# Patient Record
Sex: FEMALE | Race: WHITE | NOT HISPANIC OR LATINO | URBAN - METROPOLITAN AREA
[De-identification: names, ages, dates, MRNs, and addresses within clinical notes are randomized per-mention and may not be internally consistent; named-entity substitution may affect disease eponyms.]

---

## 2017-04-05 ENCOUNTER — INPATIENT (INPATIENT)
Facility: HOSPITAL | Age: 82
LOS: 4 days | Discharge: NURSING, REHAB OR LONG-TERM CARE | End: 2017-04-10
Attending: SURGERY

## 2017-04-11 PROBLEM — Z00.00 ENCOUNTER FOR PREVENTIVE HEALTH EXAMINATION: Status: ACTIVE | Noted: 2017-04-11

## 2017-05-09 ENCOUNTER — OUTPATIENT (OUTPATIENT)
Dept: OUTPATIENT SERVICES | Facility: HOSPITAL | Age: 82
LOS: 1 days | Discharge: HOME | End: 2017-05-09

## 2017-06-28 DIAGNOSIS — R79.89 OTHER SPECIFIED ABNORMAL FINDINGS OF BLOOD CHEMISTRY: ICD-10-CM

## 2017-06-28 DIAGNOSIS — E55.9 VITAMIN D DEFICIENCY, UNSPECIFIED: ICD-10-CM

## 2017-06-28 DIAGNOSIS — D50.9 IRON DEFICIENCY ANEMIA, UNSPECIFIED: ICD-10-CM

## 2017-06-28 DIAGNOSIS — D52.9 FOLATE DEFICIENCY ANEMIA, UNSPECIFIED: ICD-10-CM

## 2017-06-28 DIAGNOSIS — E11.9 TYPE 2 DIABETES MELLITUS WITHOUT COMPLICATIONS: ICD-10-CM

## 2017-06-28 DIAGNOSIS — R82.90 UNSPECIFIED ABNORMAL FINDINGS IN URINE: ICD-10-CM

## 2017-06-28 DIAGNOSIS — E53.8 DEFICIENCY OF OTHER SPECIFIED B GROUP VITAMINS: ICD-10-CM

## 2017-06-28 DIAGNOSIS — E03.9 HYPOTHYROIDISM, UNSPECIFIED: ICD-10-CM

## 2017-09-07 ENCOUNTER — OUTPATIENT (OUTPATIENT)
Dept: OUTPATIENT SERVICES | Facility: HOSPITAL | Age: 82
LOS: 1 days | Discharge: HOME | End: 2017-09-07
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
## 2017-09-07 DIAGNOSIS — E11.9 TYPE 2 DIABETES MELLITUS WITHOUT COMPLICATIONS: ICD-10-CM

## 2017-09-07 DIAGNOSIS — E53.8 DEFICIENCY OF OTHER SPECIFIED B GROUP VITAMINS: ICD-10-CM

## 2017-09-07 DIAGNOSIS — E03.9 HYPOTHYROIDISM, UNSPECIFIED: ICD-10-CM

## 2017-09-07 DIAGNOSIS — D50.9 IRON DEFICIENCY ANEMIA, UNSPECIFIED: ICD-10-CM

## 2017-09-07 DIAGNOSIS — E55.9 VITAMIN D DEFICIENCY, UNSPECIFIED: ICD-10-CM

## 2018-02-28 DIAGNOSIS — S32.9XXA FRACTURE OF UNSPECIFIED PARTS OF LUMBOSACRAL SPINE AND PELVIS, INITIAL ENCOUNTER FOR CLOSED FRACTURE: ICD-10-CM

## 2018-02-28 DIAGNOSIS — M10.9 GOUT, UNSPECIFIED: ICD-10-CM
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
## 2018-02-28 DIAGNOSIS — I10 ESSENTIAL (PRIMARY) HYPERTENSION: ICD-10-CM
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
## 2018-02-28 DIAGNOSIS — S32.411A DISPLACED FRACTURE OF ANTERIOR WALL OF RIGHT ACETABULUM, INITIAL ENCOUNTER FOR CLOSED FRACTURE: ICD-10-CM
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
## 2018-02-28 DIAGNOSIS — Z90.49 ACQUIRED ABSENCE OF OTHER SPECIFIED PARTS OF DIGESTIVE TRACT: ICD-10-CM

## 2018-02-28 DIAGNOSIS — S32.591A OTHER SPECIFIED FRACTURE OF RIGHT PUBIS, INITIAL ENCOUNTER FOR CLOSED FRACTURE: ICD-10-CM

## 2018-02-28 DIAGNOSIS — S32.511A FRACTURE OF SUPERIOR RIM OF RIGHT PUBIS, INITIAL ENCOUNTER FOR CLOSED FRACTURE: ICD-10-CM

## 2018-02-28 DIAGNOSIS — M19.90 UNSPECIFIED OSTEOARTHRITIS, UNSPECIFIED SITE: ICD-10-CM

## 2018-07-19 ENCOUNTER — OUTPATIENT (OUTPATIENT)
Dept: OUTPATIENT SERVICES | Facility: HOSPITAL | Age: 83
LOS: 1 days | Discharge: HOME | End: 2018-07-19

## 2018-07-19 DIAGNOSIS — E78.2 MIXED HYPERLIPIDEMIA: ICD-10-CM

## 2018-07-19 DIAGNOSIS — E55.9 VITAMIN D DEFICIENCY, UNSPECIFIED: ICD-10-CM

## 2018-07-19 DIAGNOSIS — E11.9 TYPE 2 DIABETES MELLITUS WITHOUT COMPLICATIONS: ICD-10-CM

## 2018-07-19 DIAGNOSIS — E53.8 DEFICIENCY OF OTHER SPECIFIED B GROUP VITAMINS: ICD-10-CM

## 2018-07-19 DIAGNOSIS — E03.9 HYPOTHYROIDISM, UNSPECIFIED: ICD-10-CM

## 2018-07-19 DIAGNOSIS — R79.89 OTHER SPECIFIED ABNORMAL FINDINGS OF BLOOD CHEMISTRY: ICD-10-CM

## 2018-07-19 DIAGNOSIS — D64.9 ANEMIA, UNSPECIFIED: ICD-10-CM

## 2018-11-27 ENCOUNTER — APPOINTMENT (OUTPATIENT)
Dept: GERIATRICS | Facility: HOME HEALTH | Age: 83
End: 2018-11-27

## 2018-11-27 DIAGNOSIS — Z63.4 DISAPPEARANCE AND DEATH OF FAMILY MEMBER: ICD-10-CM

## 2018-11-27 DIAGNOSIS — Z78.9 OTHER SPECIFIED HEALTH STATUS: ICD-10-CM

## 2018-11-27 SDOH — SOCIAL STABILITY - SOCIAL INSECURITY: DISSAPEARANCE AND DEATH OF FAMILY MEMBER: Z63.4

## 2018-11-28 VITALS
OXYGEN SATURATION: 95 % | HEART RATE: 55 BPM | SYSTOLIC BLOOD PRESSURE: 142 MMHG | TEMPERATURE: 98.4 F | RESPIRATION RATE: 18 BRPM | DIASTOLIC BLOOD PRESSURE: 60 MMHG

## 2018-11-28 PROBLEM — Z78.9 DOES NOT USE ILLICIT DRUGS: Status: ACTIVE | Noted: 2018-11-28

## 2018-11-28 PROBLEM — Z63.4 WIDOWED: Status: ACTIVE | Noted: 2018-11-28

## 2018-11-28 RX ORDER — DOCUSATE SODIUM 100 MG/1
100 CAPSULE ORAL TWICE DAILY
Refills: 0 | Status: ACTIVE | COMMUNITY

## 2018-11-28 RX ORDER — SENNOSIDES 8.6 MG
8.6 TABLET ORAL
Refills: 0 | Status: ACTIVE | COMMUNITY

## 2018-12-03 ENCOUNTER — OUTPATIENT (OUTPATIENT)
Dept: OUTPATIENT SERVICES | Facility: HOSPITAL | Age: 83
LOS: 1 days | Discharge: HOME | End: 2018-12-03

## 2018-12-03 DIAGNOSIS — M19.90 UNSPECIFIED OSTEOARTHRITIS, UNSPECIFIED SITE: ICD-10-CM

## 2018-12-04 ENCOUNTER — TRANSCRIPTION ENCOUNTER (OUTPATIENT)
Age: 83
End: 2018-12-04

## 2018-12-04 LAB
25(OH)D3 SERPL-MCNC: 23 NG/ML
ALBUMIN SERPL ELPH-MCNC: 3.9 G/DL
ALP BLD-CCNC: 81 U/L
ALT SERPL-CCNC: 11 U/L
ANION GAP SERPL CALC-SCNC: 15 MMOL/L
AST SERPL-CCNC: 16 U/L
BASOPHILS # BLD AUTO: 0.05 K/UL
BASOPHILS NFR BLD AUTO: 0.5 %
BILIRUB SERPL-MCNC: 0.6 MG/DL
BUN SERPL-MCNC: 10 MG/DL
CALCIUM SERPL-MCNC: 9 MG/DL
CHLORIDE SERPL-SCNC: 102 MMOL/L
CHOLEST SERPL-MCNC: 129 MG/DL
CHOLEST/HDLC SERPL: 3.7 RATIO
CO2 SERPL-SCNC: 27 MMOL/L
CREAT SERPL-MCNC: 0.8 MG/DL
EOSINOPHIL # BLD AUTO: 0.27 K/UL
EOSINOPHIL NFR BLD AUTO: 2.6 %
ESTIMATED AVERAGE GLUCOSE: 103 MG/DL
FOLATE SERPL-MCNC: 13.8 NG/ML
GLUCOSE SERPL-MCNC: 82 MG/DL
HBA1C MFR BLD HPLC: 5.2 %
HCT VFR BLD CALC: 37.6 %
HDLC SERPL-MCNC: 35 MG/DL
HGB BLD-MCNC: 11.9 G/DL
IMM GRANULOCYTES NFR BLD AUTO: 0.3 %
LDLC SERPL CALC-MCNC: 89 MG/DL
LYMPHOCYTES # BLD AUTO: 3.53 K/UL
LYMPHOCYTES NFR BLD AUTO: 34.3 %
MAN DIFF?: NORMAL
MCHC RBC-ENTMCNC: 31.6 G/DL
MCHC RBC-ENTMCNC: 32.4 PG
MCV RBC AUTO: 102.5 FL
MONOCYTES # BLD AUTO: 0.98 K/UL
MONOCYTES NFR BLD AUTO: 9.5 %
NEUTROPHILS # BLD AUTO: 5.43 K/UL
NEUTROPHILS NFR BLD AUTO: 52.8 %
PLATELET # BLD AUTO: 318 K/UL
POTASSIUM SERPL-SCNC: 4.3 MMOL/L
PROT SERPL-MCNC: 6.9 G/DL
RBC # BLD: 3.67 M/UL
RBC # FLD: 13.8 %
SODIUM SERPL-SCNC: 144 MMOL/L
T3 SERPL-MCNC: 104 NG/DL
T4 FREE SERPL-MCNC: 1.2 NG/DL
TRIGL SERPL-MCNC: 75 MG/DL
TSH SERPL-ACNC: 1.63 UIU/ML
VIT B12 SERPL-MCNC: 445 PG/ML
WBC # FLD AUTO: 10.29 K/UL

## 2018-12-09 RX ORDER — CHROMIUM 200 MCG
1000 TABLET ORAL DAILY
Qty: 30 | Refills: 0 | Status: ACTIVE | COMMUNITY
Start: 2018-12-09 | End: 1900-01-01

## 2019-01-15 ENCOUNTER — RX RENEWAL (OUTPATIENT)
Age: 84
End: 2019-01-15

## 2019-03-09 ENCOUNTER — APPOINTMENT (OUTPATIENT)
Dept: GERIATRICS | Facility: HOME HEALTH | Age: 84
End: 2019-03-09

## 2019-03-09 DIAGNOSIS — K21.9 GASTRO-ESOPHAGEAL REFLUX DISEASE W/OUT ESOPHAGITIS: ICD-10-CM

## 2019-03-09 DIAGNOSIS — I10 ESSENTIAL (PRIMARY) HYPERTENSION: ICD-10-CM

## 2019-03-09 DIAGNOSIS — R00.1 BRADYCARDIA, UNSPECIFIED: ICD-10-CM

## 2019-03-09 DIAGNOSIS — M19.90 UNSPECIFIED OSTEOARTHRITIS, UNSPECIFIED SITE: ICD-10-CM

## 2019-03-09 DIAGNOSIS — M10.9 GOUT, UNSPECIFIED: ICD-10-CM

## 2019-03-09 DIAGNOSIS — K59.09 OTHER CONSTIPATION: ICD-10-CM

## 2019-03-09 DIAGNOSIS — E55.9 VITAMIN D DEFICIENCY, UNSPECIFIED: ICD-10-CM

## 2019-03-09 DIAGNOSIS — K64.9 UNSPECIFIED HEMORRHOIDS: ICD-10-CM

## 2019-03-09 DIAGNOSIS — M81.0 AGE-RELATED OSTEOPOROSIS W/OUT CURRENT PATHOLOGICAL FRACTURE: ICD-10-CM

## 2019-03-09 DIAGNOSIS — F32.9 MAJOR DEPRESSIVE DISORDER, SINGLE EPISODE, UNSPECIFIED: ICD-10-CM

## 2019-03-10 VITALS
DIASTOLIC BLOOD PRESSURE: 72 MMHG | SYSTOLIC BLOOD PRESSURE: 112 MMHG | TEMPERATURE: 98.6 F | HEART RATE: 56 BPM | OXYGEN SATURATION: 98 % | RESPIRATION RATE: 16 BRPM

## 2019-03-10 PROBLEM — I10 HTN (HYPERTENSION): Status: ACTIVE | Noted: 2018-11-28

## 2019-03-10 PROBLEM — F32.9 DEPRESSION: Status: ACTIVE | Noted: 2018-11-28

## 2019-03-10 PROBLEM — M81.0 OSTEOPOROSIS: Status: ACTIVE | Noted: 2018-11-28

## 2019-03-10 PROBLEM — R00.1 BRADYCARDIA: Status: ACTIVE | Noted: 2019-03-10

## 2019-03-10 PROBLEM — K21.9 GERD (GASTROESOPHAGEAL REFLUX DISEASE): Status: ACTIVE | Noted: 2018-11-28

## 2019-03-10 PROBLEM — M19.90 OSTEOARTHRITIS: Status: ACTIVE | Noted: 2018-11-28

## 2019-03-10 PROBLEM — K59.09 CONSTIPATION, CHRONIC: Status: ACTIVE | Noted: 2018-11-28

## 2019-03-10 PROBLEM — M10.9 GOUT: Status: ACTIVE | Noted: 2018-11-28

## 2019-03-10 PROBLEM — E55.9 VITAMIN D DEFICIENCY: Status: ACTIVE | Noted: 2018-12-09

## 2019-03-10 PROBLEM — K64.9 HEMORRHOID: Status: ACTIVE | Noted: 2019-03-10

## 2019-03-10 RX ORDER — ATENOLOL 25 MG/1
25 TABLET ORAL
Refills: 0 | Status: ACTIVE | COMMUNITY
Start: 2018-03-28

## 2019-03-10 NOTE — ASSESSMENT
[FreeTextEntry1] : Bradycardia\par - HR generally in the high 50's during visits but upon review of the HHA's logs HR can drop into the 40's.  Patient does report some fatigue weakness when questioned directly\par - will decrease Atenolol to 25mg PO Qd and monitor HR.  Advised Dtr to keep a log and to call in 2 weeks with results so we can gauge progress\par \par Hemorrhoids\par - advised OTC medications including Tucks pads, Witch Hazel, Preparation H to start.  Advised to call if no relief and will send in steroid to pharmacy\par \par HTN\par - b/p acceptable\par - c/w Diovan at 160mg QD\par - low Na+ diet\par \par Depression\par - stable\par - c/w Paxil\par \par GERD\par - stable\par - c/w Ranitidine\par \par -Generalized Weakness/OA/Debility\par - PT/OT evaluation\par - continue to use walker as assistive device\par - APAP and Tramadol for pain (RX for tramadol sent to pharmacy via [facility] on 3/10/19 #90).  iSTOP verified, last RX sent by us on 11/29/18, no other Rx for narcotics noted)\par \par OsteoPorosis\par - c/w current medications\par \par

## 2019-03-10 NOTE — HISTORY OF PRESENT ILLNESS
[FreeTextEntry1] : Patient seen and examined at home.  HHA and Dtr present during visit.  Overall patient reports feeling well.  Her main complaint is chronic pain to her low back and BLE.  Currently receiving APAP and only having mild relief.  No complaints of CP/SOB.  No abdominal complaints and moving bowels well.  No urinary complaints.  HHA checking B/P daily and stable.  He reports she has a good appetite.  She is reporting that she is experiencing hemorrhoids intermittently though is not using any OTC medications.   [Mild] : Stage: Mild [Stable] : Status: Stable [Memory Lapses Or Loss] : stable memory impairment [Patient Observed To Be Agitated] : denies agitation [Hostility Toward Caregivers] : denies aggression [Sleep Disturbances] : denies sleep disturbances [] : denies wandering [Fixed Beliefs Contradicted By Reality (Delusions)] : denies delusions [Difficulty Finding Desired Words] : denies difficulty finding desired words [0] : 2) Feeling down, depressed, or hopeless: Not at all

## 2019-03-10 NOTE — REASON FOR VISIT
[Follow-Up] : a follow-up visit [Formal Caregiver] : formal caregiver [Family Member] : family member

## 2019-03-10 NOTE — PHYSICAL EXAM
[General Appearance - Alert] : alert [General Appearance - In No Acute Distress] : in no acute distress [General Appearance - Well Nourished] : well nourished [General Appearance - Well Developed] : well developed [Sclera] : the sclera and conjunctiva were normal [Extraocular Movements] : extraocular movements were intact [Normal Oral Mucosa] : normal oral mucosa [No Oral Pallor] : no oral pallor [No Oral Cyanosis] : no oral cyanosis [Outer Ear] : the ears and nose were normal in appearance [Hearing Threshold Finger Rub Not [place]] : hearing was normal [Examination Of The Oral Cavity] : the lips and gums were normal [Neck Appearance] : the appearance of the neck was normal [Neck Cervical Mass (___cm)] : no neck mass was observed [Jugular Venous Distention Increased] : there was no jugular-venous distention [] : no respiratory distress [Respiration, Rhythm And Depth] : normal respiratory rhythm and effort [Exaggerated Use Of Accessory Muscles For Inspiration] : no accessory muscle use [Auscultation Breath Sounds / Voice Sounds] : lungs were clear to auscultation bilaterally [Heart Rate And Rhythm] : heart rate was normal and rhythm regular [Heart Sounds] : normal S1 and S2 [Edema] : there was no peripheral edema [Abdomen Soft] : soft [Abdomen Tenderness] : non-tender [No CVA Tenderness] : no ~M costovertebral angle tenderness [Nail Clubbing] : no clubbing  or cyanosis of the fingernails [Involuntary Movements] : no involuntary movements were seen [Skin Color & Pigmentation] : normal skin color and pigmentation [FreeTextEntry1] : senile turgor [Cranial Nerves] : cranial nerves 2-12 were intact [No Focal Deficits] : no focal deficits [Oriented To Time, Place, And Person] : oriented to person, place, and time [Impaired Insight] : insight and judgment were intact [Affect] : the affect was normal [Mood] : the mood was normal

## 2019-03-27 ENCOUNTER — MOBILE ON CALL (OUTPATIENT)
Age: 84
End: 2019-03-27

## 2019-03-27 DIAGNOSIS — R60.9 EDEMA, UNSPECIFIED: ICD-10-CM

## 2019-03-27 RX ORDER — HYDROCORTISONE 25 MG/G
2.5 CREAM TOPICAL
Qty: 1 | Refills: 3 | Status: ACTIVE | COMMUNITY
Start: 2019-03-27 | End: 1900-01-01

## 2019-03-27 RX ORDER — VALSARTAN 320 MG/1
320 TABLET, COATED ORAL
Refills: 0 | Status: ACTIVE | COMMUNITY

## 2019-03-27 RX ORDER — TRAMADOL HYDROCHLORIDE 50 MG/1
50 TABLET, COATED ORAL
Refills: 0 | Status: ACTIVE | COMMUNITY
Start: 2018-07-16

## 2019-03-27 RX ORDER — RANITIDINE 150 MG/1
150 TABLET, FILM COATED ORAL
Refills: 0 | Status: ACTIVE | COMMUNITY

## 2019-03-27 RX ORDER — PAROXETINE HYDROCHLORIDE 10 MG/1
10 TABLET, FILM COATED ORAL
Refills: 0 | Status: ACTIVE | COMMUNITY
Start: 2017-08-12

## 2019-03-27 RX ORDER — FUROSEMIDE 20 MG/1
20 TABLET ORAL DAILY
Refills: 0 | Status: ACTIVE | COMMUNITY

## 2019-03-29 ENCOUNTER — MOBILE ON CALL (OUTPATIENT)
Age: 84
End: 2019-03-29

## 2019-04-03 ENCOUNTER — RX RENEWAL (OUTPATIENT)
Age: 84
End: 2019-04-03

## 2019-04-03 RX ORDER — RANITIDINE 150 MG/1
150 TABLET ORAL
Qty: 180 | Refills: 3 | Status: ACTIVE | COMMUNITY
Start: 2019-04-03 | End: 1900-01-01

## 2019-05-01 ENCOUNTER — OUTPATIENT (OUTPATIENT)
Dept: OUTPATIENT SERVICES | Facility: HOSPITAL | Age: 84
LOS: 1 days | Discharge: HOME | End: 2019-05-01

## 2019-05-01 DIAGNOSIS — I10 ESSENTIAL (PRIMARY) HYPERTENSION: ICD-10-CM

## 2019-05-17 ENCOUNTER — OUTPATIENT (OUTPATIENT)
Dept: OUTPATIENT SERVICES | Facility: HOSPITAL | Age: 84
LOS: 1 days | Discharge: HOME | End: 2019-05-17

## 2019-05-17 DIAGNOSIS — I10 ESSENTIAL (PRIMARY) HYPERTENSION: ICD-10-CM

## 2019-05-17 DIAGNOSIS — Z29.9 ENCOUNTER FOR PROPHYLACTIC MEASURES, UNSPECIFIED: ICD-10-CM

## 2019-05-17 DIAGNOSIS — K21.9 GASTRO-ESOPHAGEAL REFLUX DISEASE WITHOUT ESOPHAGITIS: ICD-10-CM

## 2019-07-04 ENCOUNTER — OUTPATIENT (OUTPATIENT)
Dept: OUTPATIENT SERVICES | Facility: HOSPITAL | Age: 84
LOS: 1 days | Discharge: HOME | End: 2019-07-04

## 2019-07-04 DIAGNOSIS — G89.29 OTHER CHRONIC PAIN: ICD-10-CM

## 2019-09-23 ENCOUNTER — OUTPATIENT (OUTPATIENT)
Dept: OUTPATIENT SERVICES | Facility: HOSPITAL | Age: 84
LOS: 1 days | Discharge: HOME | End: 2019-09-23

## 2019-09-23 DIAGNOSIS — M10.072 IDIOPATHIC GOUT, LEFT ANKLE AND FOOT: ICD-10-CM

## 2019-11-08 ENCOUNTER — OUTPATIENT (OUTPATIENT)
Dept: OUTPATIENT SERVICES | Facility: HOSPITAL | Age: 84
LOS: 1 days | Discharge: HOME | End: 2019-11-08

## 2019-11-08 DIAGNOSIS — K21.9 GASTRO-ESOPHAGEAL REFLUX DISEASE WITHOUT ESOPHAGITIS: ICD-10-CM

## 2019-11-08 DIAGNOSIS — I10 ESSENTIAL (PRIMARY) HYPERTENSION: ICD-10-CM

## 2019-11-08 DIAGNOSIS — Z29.9 ENCOUNTER FOR PROPHYLACTIC MEASURES, UNSPECIFIED: ICD-10-CM

## 2019-11-19 ENCOUNTER — OUTPATIENT (OUTPATIENT)
Dept: OUTPATIENT SERVICES | Facility: HOSPITAL | Age: 84
LOS: 1 days | Discharge: HOME | End: 2019-11-19

## 2019-11-19 DIAGNOSIS — K62.5 HEMORRHAGE OF ANUS AND RECTUM: ICD-10-CM

## 2021-11-05 ENCOUNTER — EMERGENCY (EMERGENCY)
Facility: HOSPITAL | Age: 86
LOS: 0 days | Discharge: HOME | End: 2021-11-06
Attending: STUDENT IN AN ORGANIZED HEALTH CARE EDUCATION/TRAINING PROGRAM | Admitting: STUDENT IN AN ORGANIZED HEALTH CARE EDUCATION/TRAINING PROGRAM
Payer: MEDICARE

## 2021-11-05 VITALS
OXYGEN SATURATION: 98 % | DIASTOLIC BLOOD PRESSURE: 81 MMHG | TEMPERATURE: 97 F | RESPIRATION RATE: 18 BRPM | HEART RATE: 50 BPM | SYSTOLIC BLOOD PRESSURE: 189 MMHG

## 2021-11-05 DIAGNOSIS — R10.31 RIGHT LOWER QUADRANT PAIN: ICD-10-CM

## 2021-11-05 DIAGNOSIS — Z20.822 CONTACT WITH AND (SUSPECTED) EXPOSURE TO COVID-19: ICD-10-CM

## 2021-11-05 DIAGNOSIS — N39.0 URINARY TRACT INFECTION, SITE NOT SPECIFIED: ICD-10-CM

## 2021-11-05 DIAGNOSIS — I10 ESSENTIAL (PRIMARY) HYPERTENSION: ICD-10-CM

## 2021-11-05 LAB
ALBUMIN SERPL ELPH-MCNC: 4.2 G/DL — SIGNIFICANT CHANGE UP (ref 3.5–5.2)
ALP SERPL-CCNC: 79 U/L — SIGNIFICANT CHANGE UP (ref 30–115)
ALT FLD-CCNC: 11 U/L — SIGNIFICANT CHANGE UP (ref 0–41)
ANION GAP SERPL CALC-SCNC: 13 MMOL/L — SIGNIFICANT CHANGE UP (ref 7–14)
APPEARANCE UR: ABNORMAL
AST SERPL-CCNC: 20 U/L — SIGNIFICANT CHANGE UP (ref 0–41)
BACTERIA # UR AUTO: ABNORMAL
BASOPHILS # BLD AUTO: 0.05 K/UL — SIGNIFICANT CHANGE UP (ref 0–0.2)
BASOPHILS NFR BLD AUTO: 0.5 % — SIGNIFICANT CHANGE UP (ref 0–1)
BILIRUB SERPL-MCNC: 0.4 MG/DL — SIGNIFICANT CHANGE UP (ref 0.2–1.2)
BILIRUB UR-MCNC: NEGATIVE — SIGNIFICANT CHANGE UP
BLD GP AB SCN SERPL QL: SIGNIFICANT CHANGE UP
BUN SERPL-MCNC: 13 MG/DL — SIGNIFICANT CHANGE UP (ref 10–20)
CALCIUM SERPL-MCNC: 9.9 MG/DL — SIGNIFICANT CHANGE UP (ref 8.5–10.1)
CHLORIDE SERPL-SCNC: 102 MMOL/L — SIGNIFICANT CHANGE UP (ref 98–110)
CO2 SERPL-SCNC: 23 MMOL/L — SIGNIFICANT CHANGE UP (ref 17–32)
COLOR SPEC: YELLOW — SIGNIFICANT CHANGE UP
CREAT SERPL-MCNC: 0.7 MG/DL — SIGNIFICANT CHANGE UP (ref 0.7–1.5)
DIFF PNL FLD: ABNORMAL
EOSINOPHIL # BLD AUTO: 0.19 K/UL — SIGNIFICANT CHANGE UP (ref 0–0.7)
EOSINOPHIL NFR BLD AUTO: 2 % — SIGNIFICANT CHANGE UP (ref 0–8)
EPI CELLS # UR: 4 /HPF — SIGNIFICANT CHANGE UP (ref 0–5)
GLUCOSE SERPL-MCNC: 103 MG/DL — HIGH (ref 70–99)
GLUCOSE UR QL: NEGATIVE — SIGNIFICANT CHANGE UP
HCT VFR BLD CALC: 44.8 % — SIGNIFICANT CHANGE UP (ref 37–47)
HGB BLD-MCNC: 14.3 G/DL — SIGNIFICANT CHANGE UP (ref 12–16)
HYALINE CASTS # UR AUTO: 3 /LPF — SIGNIFICANT CHANGE UP (ref 0–7)
IMM GRANULOCYTES NFR BLD AUTO: 0.2 % — SIGNIFICANT CHANGE UP (ref 0.1–0.3)
KETONES UR-MCNC: NEGATIVE — SIGNIFICANT CHANGE UP
LACTATE SERPL-SCNC: 1.1 MMOL/L — SIGNIFICANT CHANGE UP (ref 0.7–2)
LEUKOCYTE ESTERASE UR-ACNC: ABNORMAL
LIDOCAIN IGE QN: 35 U/L — SIGNIFICANT CHANGE UP (ref 7–60)
LYMPHOCYTES # BLD AUTO: 4.16 K/UL — HIGH (ref 1.2–3.4)
LYMPHOCYTES # BLD AUTO: 43.7 % — SIGNIFICANT CHANGE UP (ref 20.5–51.1)
MCHC RBC-ENTMCNC: 31.7 PG — HIGH (ref 27–31)
MCHC RBC-ENTMCNC: 31.9 G/DL — LOW (ref 32–37)
MCV RBC AUTO: 99.3 FL — HIGH (ref 81–99)
MONOCYTES # BLD AUTO: 0.77 K/UL — HIGH (ref 0.1–0.6)
MONOCYTES NFR BLD AUTO: 8.1 % — SIGNIFICANT CHANGE UP (ref 1.7–9.3)
NEUTROPHILS # BLD AUTO: 4.33 K/UL — SIGNIFICANT CHANGE UP (ref 1.4–6.5)
NEUTROPHILS NFR BLD AUTO: 45.5 % — SIGNIFICANT CHANGE UP (ref 42.2–75.2)
NITRITE UR-MCNC: NEGATIVE — SIGNIFICANT CHANGE UP
NRBC # BLD: 0 /100 WBCS — SIGNIFICANT CHANGE UP (ref 0–0)
PH UR: 6 — SIGNIFICANT CHANGE UP (ref 5–8)
PLATELET # BLD AUTO: 283 K/UL — SIGNIFICANT CHANGE UP (ref 130–400)
POTASSIUM SERPL-MCNC: 4.9 MMOL/L — SIGNIFICANT CHANGE UP (ref 3.5–5)
POTASSIUM SERPL-SCNC: 4.9 MMOL/L — SIGNIFICANT CHANGE UP (ref 3.5–5)
PROT SERPL-MCNC: 7.9 G/DL — SIGNIFICANT CHANGE UP (ref 6–8)
PROT UR-MCNC: ABNORMAL
RBC # BLD: 4.51 M/UL — SIGNIFICANT CHANGE UP (ref 4.2–5.4)
RBC # FLD: 14.6 % — HIGH (ref 11.5–14.5)
RBC CASTS # UR COMP ASSIST: 4 /HPF — SIGNIFICANT CHANGE UP (ref 0–4)
SARS-COV-2 RNA SPEC QL NAA+PROBE: SIGNIFICANT CHANGE UP
SODIUM SERPL-SCNC: 138 MMOL/L — SIGNIFICANT CHANGE UP (ref 135–146)
SP GR SPEC: 1.02 — SIGNIFICANT CHANGE UP (ref 1.01–1.03)
TROPONIN T SERPL-MCNC: <0.01 NG/ML — SIGNIFICANT CHANGE UP
UROBILINOGEN FLD QL: SIGNIFICANT CHANGE UP
WBC # BLD: 9.52 K/UL — SIGNIFICANT CHANGE UP (ref 4.8–10.8)
WBC # FLD AUTO: 9.52 K/UL — SIGNIFICANT CHANGE UP (ref 4.8–10.8)
WBC UR QL: 12 /HPF — HIGH (ref 0–5)

## 2021-11-05 PROCEDURE — 99285 EMERGENCY DEPT VISIT HI MDM: CPT

## 2021-11-05 PROCEDURE — 74177 CT ABD & PELVIS W/CONTRAST: CPT | Mod: 26,MA

## 2021-11-05 PROCEDURE — 93010 ELECTROCARDIOGRAM REPORT: CPT

## 2021-11-05 RX ORDER — ACETAMINOPHEN 500 MG
650 TABLET ORAL ONCE
Refills: 0 | Status: COMPLETED | OUTPATIENT
Start: 2021-11-05 | End: 2021-11-05

## 2021-11-05 RX ORDER — CEFTRIAXONE 500 MG/1
1000 INJECTION, POWDER, FOR SOLUTION INTRAMUSCULAR; INTRAVENOUS ONCE
Refills: 0 | Status: COMPLETED | OUTPATIENT
Start: 2021-11-05 | End: 2021-11-05

## 2021-11-05 RX ADMIN — Medication 650 MILLIGRAM(S): at 20:52

## 2021-11-05 RX ADMIN — CEFTRIAXONE 100 MILLIGRAM(S): 500 INJECTION, POWDER, FOR SOLUTION INTRAMUSCULAR; INTRAVENOUS at 22:52

## 2021-11-05 NOTE — ED PROVIDER NOTE - OBJECTIVE STATEMENT
89 yo female hx of htn, constipation presenting with RLQ pain for the past day, no relief. No aggravating or relieving symptoms. No vaginal or rectal bleeding. No dysuria. last bowel movement today. no nausea, vomiting, diarrhea.

## 2021-11-05 NOTE — ED PROVIDER NOTE - NSFOLLOWUPINSTRUCTIONS_ED_ALL_ED_FT
Your urine was positive for white blood cells, please take cefpodoxime 200 mg twice daily for 5 days.    Urinary Tract Infection    A urinary tract infection (UTI) is an infection of any part of the urinary tract, which includes the kidneys, ureters, bladder, and urethra. Risk factors include ignoring your need to urinate, wiping back to front if female, being an uncircumcised male, and having diabetes or a weak immune system. Symptoms include frequent urination, pain or burning with urination, foul smelling urine, cloudy urine, pain in the lower abdomen, blood in the urine, and fever. If you were prescribed an antibiotic medicine, take it as told by your health care provider. Do not stop taking the antibiotic even if you start to feel better.    SEEK IMMEDIATE MEDICAL CARE IF YOU HAVE THE FOLLOWING SYMPTOMS: severe back or abdominal pain, inability to keep fluids or medicine down, dizziness/lightheadedness, or a change in mental status.  Abdominal Pain    Many things can cause abdominal pain. Usually, abdominal pain is not caused by a disease and will improve without treatment. Your health care provider will do a physical exam and possibly order blood tests and imaging to help determine the seriousness of your pain. However, in many cases, no cause may be found and you may need further testing as an outpatient. Monitor your abdominal pain for any changes.     SEEK IMMEDIATE MEDICAL CARE IF YOU HAVE THE FOLLOWING SYMPTOMS: worsening abdominal pain, vomiting, diarrhea, inability to have bowel movements or pass gas, black or bloody stool, fever accompanying chest pain or back pain, or dizziness/lightheadedness.

## 2021-11-05 NOTE — ED ADULT NURSE NOTE - NSIMPLEMENTINTERV_GEN_ALL_ED
Implemented All Fall Risk Interventions:  Ray to call system. Call bell, personal items and telephone within reach. Instruct patient to call for assistance. Room bathroom lighting operational. Non-slip footwear when patient is off stretcher. Physically safe environment: no spills, clutter or unnecessary equipment. Stretcher in lowest position, wheels locked, appropriate side rails in place. Provide visual cue, wrist band, yellow gown, etc. Monitor gait and stability. Monitor for mental status changes and reorient to person, place, and time. Review medications for side effects contributing to fall risk. Reinforce activity limits and safety measures with patient and family.

## 2021-11-05 NOTE — ED PROVIDER NOTE - CLINICAL SUMMARY MEDICAL DECISION MAKING FREE TEXT BOX
89 yr old f that presents with abd pain. labs, ekg, imaging obtained. pt informed of UTI, and family. pt/family offered admission, they wish to have pt returned back to NH. Will dc pt with pcp follow up and strict return precautions.

## 2021-11-05 NOTE — ED PROVIDER NOTE - PROGRESS NOTE DETAILS
WF: d/w family at bedside, abd nontender,  ct neg for acute findings, urine + for LE, will treat for UTI, will send abx recommendation for [facility], pt and family agreeable with plan.

## 2021-11-05 NOTE — ED PROVIDER NOTE - ATTENDING CONTRIBUTION TO CARE
89 yr old f w/ a pmh significant for htn, constipation, chronic R femur fx who presents today with abd pain. Pt states that yesterday she developed RLQ abd pain. Pt states that the pain started all of the sudden, and denies any trauma to the area. Pt also denies any dysuria, fevers, chills, nausea, vomiting, or any other complaints.   Of note, pt states that she had a bowel movement today, NB.     VITAL SIGNS: I have reviewed nursing notes and confirm.  CONSTITUTIONAL: non-toxic, well appearing  SKIN: no rash, no petechiae.  EYES: EOMI, pink conjunctiva, anicteric  ENT: tongue midline, no exudates, MMM  NECK: Supple; no meningismus, no JVD  CARD: RRR, no murmurs, equal radial pulses bilaterally 2+  RESP: CTAB, no respiratory distress  ABD: Soft, non-tender, non-distended, no peritoneal signs  EXT: Normal ROM x4. No edema. No calves tenderness  NEURO: Alert, oriented. CN2-12 intact, equal strength bilaterally    a/p  89 yr old f that presents with abd pain  -labs  -ua  -imaging  -pain management  -reassess  -dispo pending

## 2021-11-05 NOTE — ED PROVIDER NOTE - NS ED ROS FT
Constitutional:  see HPI  Head:  no headache, dizziness, loss of consciousness  Eyes:  no visual changes; no eye pain, redness, or discharge  ENMT:  no ear pain or discharge; no hearing problems; no mouth or throat sores or lesions; no throat pain  Cardiac: no chest pain, tachycardia or palpitations  Respiratory: no cough, wheezing, shortness of breath, chest tightness, or trouble breathing  GI: abd pain  :  no dysuria, frequency, or burning with urination; no change in urine output  MS: no myalgias, muscle weakness, joint pain,or  injury; no joint swelling  Neuro: no weakness; no numbness or tingling; no seizure  Skin:  no rashes or color changes; no lacerations or abrasions

## 2021-11-05 NOTE — ED PROVIDER NOTE - PATIENT PORTAL LINK FT
You can access the FollowMyHealth Patient Portal offered by  by registering at the following website: [email or web address]. By joining [employer]’s FollowMyHealth portal, you will also be able to view your health information using other applications (apps) compatible with our system.

## 2021-11-06 VITALS
DIASTOLIC BLOOD PRESSURE: 70 MMHG | TEMPERATURE: 97 F | HEART RATE: 67 BPM | RESPIRATION RATE: 15 BRPM | OXYGEN SATURATION: 99 % | SYSTOLIC BLOOD PRESSURE: 145 MMHG

## 2021-11-06 PROCEDURE — 71045 X-RAY EXAM CHEST 1 VIEW: CPT | Mod: 26

## 2021-11-08 LAB
CULTURE RESULTS: SIGNIFICANT CHANGE UP
SPECIMEN SOURCE: SIGNIFICANT CHANGE UP

## 2022-03-24 ENCOUNTER — INPATIENT (INPATIENT)
Facility: HOSPITAL | Age: 87
LOS: 6 days | Discharge: NURSING, REHAB OR LONG-TERM CARE | End: 2022-03-31
Attending: INTERNAL MEDICINE | Admitting: INTERNAL MEDICINE
Payer: MEDICARE

## 2022-03-24 VITALS
TEMPERATURE: 98 F | RESPIRATION RATE: 18 BRPM | HEART RATE: 83 BPM | SYSTOLIC BLOOD PRESSURE: 192 MMHG | OXYGEN SATURATION: 99 % | DIASTOLIC BLOOD PRESSURE: 80 MMHG

## 2022-03-24 LAB
BASOPHILS # BLD AUTO: 0.04 K/UL — SIGNIFICANT CHANGE UP (ref 0–0.2)
BASOPHILS NFR BLD AUTO: 0.4 % — SIGNIFICANT CHANGE UP (ref 0–1)
EOSINOPHIL # BLD AUTO: 0.06 K/UL — SIGNIFICANT CHANGE UP (ref 0–0.7)
EOSINOPHIL NFR BLD AUTO: 0.5 % — SIGNIFICANT CHANGE UP (ref 0–8)
HCT VFR BLD CALC: 39.3 % — SIGNIFICANT CHANGE UP (ref 37–47)
HGB BLD-MCNC: 12.8 G/DL — SIGNIFICANT CHANGE UP (ref 12–16)
IMM GRANULOCYTES NFR BLD AUTO: 0.3 % — SIGNIFICANT CHANGE UP (ref 0.1–0.3)
LYMPHOCYTES # BLD AUTO: 18.8 % — LOW (ref 20.5–51.1)
LYMPHOCYTES # BLD AUTO: 2.05 K/UL — SIGNIFICANT CHANGE UP (ref 1.2–3.4)
MCHC RBC-ENTMCNC: 32.6 G/DL — SIGNIFICANT CHANGE UP (ref 32–37)
MCHC RBC-ENTMCNC: 34.3 PG — HIGH (ref 27–31)
MCV RBC AUTO: 105.4 FL — HIGH (ref 81–99)
MONOCYTES # BLD AUTO: 0.69 K/UL — HIGH (ref 0.1–0.6)
MONOCYTES NFR BLD AUTO: 6.3 % — SIGNIFICANT CHANGE UP (ref 1.7–9.3)
NEUTROPHILS # BLD AUTO: 8.05 K/UL — HIGH (ref 1.4–6.5)
NEUTROPHILS NFR BLD AUTO: 73.7 % — SIGNIFICANT CHANGE UP (ref 42.2–75.2)
NRBC # BLD: 0 /100 WBCS — SIGNIFICANT CHANGE UP (ref 0–0)
PLATELET # BLD AUTO: 276 K/UL — SIGNIFICANT CHANGE UP (ref 130–400)
RBC # BLD: 3.73 M/UL — LOW (ref 4.2–5.4)
RBC # FLD: 13 % — SIGNIFICANT CHANGE UP (ref 11.5–14.5)
WBC # BLD: 10.92 K/UL — HIGH (ref 4.8–10.8)
WBC # FLD AUTO: 10.92 K/UL — HIGH (ref 4.8–10.8)

## 2022-03-24 PROCEDURE — 99285 EMERGENCY DEPT VISIT HI MDM: CPT

## 2022-03-24 PROCEDURE — 99053 MED SERV 10PM-8AM 24 HR FAC: CPT

## 2022-03-24 RX ORDER — MORPHINE SULFATE 50 MG/1
4 CAPSULE, EXTENDED RELEASE ORAL ONCE
Refills: 0 | Status: DISCONTINUED | OUTPATIENT
Start: 2022-03-24 | End: 2022-03-24

## 2022-03-24 RX ADMIN — MORPHINE SULFATE 4 MILLIGRAM(S): 50 CAPSULE, EXTENDED RELEASE ORAL at 23:44

## 2022-03-24 NOTE — ED ADULT TRIAGE NOTE - CHIEF COMPLAINT QUOTE
EMS called notification for r/o Left hip fracture post unwitnessed Fall at [facility]. Fall from standing/walker. Pt Alert and oriented x3. baseline ambulatory with walker. Pt stated she was walking and the next thing she woke up on the ground. Laceration to left pointer finger. No hematoma noted to head.

## 2022-03-25 LAB
ALBUMIN SERPL ELPH-MCNC: 3.6 G/DL — SIGNIFICANT CHANGE UP (ref 3.5–5.2)
ALBUMIN SERPL ELPH-MCNC: 3.8 G/DL — SIGNIFICANT CHANGE UP (ref 3.5–5.2)
ALP SERPL-CCNC: 74 U/L — SIGNIFICANT CHANGE UP (ref 30–115)
ALP SERPL-CCNC: 75 U/L — SIGNIFICANT CHANGE UP (ref 30–115)
ALT FLD-CCNC: 12 U/L — SIGNIFICANT CHANGE UP (ref 0–41)
ALT FLD-CCNC: 47 U/L — HIGH (ref 0–41)
ANION GAP SERPL CALC-SCNC: 9 MMOL/L — SIGNIFICANT CHANGE UP (ref 7–14)
ANION GAP SERPL CALC-SCNC: 9 MMOL/L — SIGNIFICANT CHANGE UP (ref 7–14)
APPEARANCE UR: CLEAR — SIGNIFICANT CHANGE UP
APTT BLD: 26.4 SEC — LOW (ref 27–39.2)
AST SERPL-CCNC: 20 U/L — SIGNIFICANT CHANGE UP (ref 0–41)
AST SERPL-CCNC: 57 U/L — HIGH (ref 0–41)
BASOPHILS # BLD AUTO: 0.03 K/UL — SIGNIFICANT CHANGE UP (ref 0–0.2)
BASOPHILS NFR BLD AUTO: 0.3 % — SIGNIFICANT CHANGE UP (ref 0–1)
BILIRUB SERPL-MCNC: 0.3 MG/DL — SIGNIFICANT CHANGE UP (ref 0.2–1.2)
BILIRUB SERPL-MCNC: 0.6 MG/DL — SIGNIFICANT CHANGE UP (ref 0.2–1.2)
BILIRUB UR-MCNC: NEGATIVE — SIGNIFICANT CHANGE UP
BLD GP AB SCN SERPL QL: SIGNIFICANT CHANGE UP
BUN SERPL-MCNC: 11 MG/DL — SIGNIFICANT CHANGE UP (ref 10–20)
BUN SERPL-MCNC: 16 MG/DL — SIGNIFICANT CHANGE UP (ref 10–20)
CALCIUM SERPL-MCNC: 8.8 MG/DL — SIGNIFICANT CHANGE UP (ref 8.5–10.1)
CALCIUM SERPL-MCNC: 9.3 MG/DL — SIGNIFICANT CHANGE UP (ref 8.5–10.1)
CHLORIDE SERPL-SCNC: 105 MMOL/L — SIGNIFICANT CHANGE UP (ref 98–110)
CHLORIDE SERPL-SCNC: 107 MMOL/L — SIGNIFICANT CHANGE UP (ref 98–110)
CK SERPL-CCNC: 56 U/L — SIGNIFICANT CHANGE UP (ref 0–225)
CO2 SERPL-SCNC: 22 MMOL/L — SIGNIFICANT CHANGE UP (ref 17–32)
CO2 SERPL-SCNC: 25 MMOL/L — SIGNIFICANT CHANGE UP (ref 17–32)
COLOR SPEC: SIGNIFICANT CHANGE UP
CREAT SERPL-MCNC: 0.5 MG/DL — LOW (ref 0.7–1.5)
CREAT SERPL-MCNC: 0.8 MG/DL — SIGNIFICANT CHANGE UP (ref 0.7–1.5)
DIFF PNL FLD: NEGATIVE — SIGNIFICANT CHANGE UP
EGFR: 67 ML/MIN/1.73M2 — SIGNIFICANT CHANGE UP
EGFR: 85 ML/MIN/1.73M2 — SIGNIFICANT CHANGE UP
EOSINOPHIL # BLD AUTO: 0.01 K/UL — SIGNIFICANT CHANGE UP (ref 0–0.7)
EOSINOPHIL NFR BLD AUTO: 0.1 % — SIGNIFICANT CHANGE UP (ref 0–8)
ETHANOL SERPL-MCNC: <10 MG/DL — SIGNIFICANT CHANGE UP
GLUCOSE SERPL-MCNC: 116 MG/DL — HIGH (ref 70–99)
GLUCOSE SERPL-MCNC: 152 MG/DL — HIGH (ref 70–99)
GLUCOSE UR QL: NEGATIVE — SIGNIFICANT CHANGE UP
HCT VFR BLD CALC: 33.7 % — LOW (ref 37–47)
HGB BLD-MCNC: 11 G/DL — LOW (ref 12–16)
IMM GRANULOCYTES NFR BLD AUTO: 0.4 % — HIGH (ref 0.1–0.3)
INR BLD: 0.97 RATIO — SIGNIFICANT CHANGE UP (ref 0.65–1.3)
KETONES UR-MCNC: NEGATIVE — SIGNIFICANT CHANGE UP
LACTATE SERPL-SCNC: 2.4 MMOL/L — HIGH (ref 0.7–2)
LEUKOCYTE ESTERASE UR-ACNC: NEGATIVE — SIGNIFICANT CHANGE UP
LIDOCAIN IGE QN: 221 U/L — HIGH (ref 7–60)
LYMPHOCYTES # BLD AUTO: 1.3 K/UL — SIGNIFICANT CHANGE UP (ref 1.2–3.4)
LYMPHOCYTES # BLD AUTO: 13.1 % — LOW (ref 20.5–51.1)
MAGNESIUM SERPL-MCNC: 1.8 MG/DL — SIGNIFICANT CHANGE UP (ref 1.8–2.4)
MCHC RBC-ENTMCNC: 32.6 G/DL — SIGNIFICANT CHANGE UP (ref 32–37)
MCHC RBC-ENTMCNC: 34.2 PG — HIGH (ref 27–31)
MCV RBC AUTO: 104.7 FL — HIGH (ref 81–99)
MONOCYTES # BLD AUTO: 0.7 K/UL — HIGH (ref 0.1–0.6)
MONOCYTES NFR BLD AUTO: 7.1 % — SIGNIFICANT CHANGE UP (ref 1.7–9.3)
NEUTROPHILS # BLD AUTO: 7.84 K/UL — HIGH (ref 1.4–6.5)
NEUTROPHILS NFR BLD AUTO: 79 % — HIGH (ref 42.2–75.2)
NITRITE UR-MCNC: NEGATIVE — SIGNIFICANT CHANGE UP
NRBC # BLD: 0 /100 WBCS — SIGNIFICANT CHANGE UP (ref 0–0)
PH UR: 7.5 — SIGNIFICANT CHANGE UP (ref 5–8)
PLATELET # BLD AUTO: 240 K/UL — SIGNIFICANT CHANGE UP (ref 130–400)
POTASSIUM SERPL-MCNC: 4.3 MMOL/L — SIGNIFICANT CHANGE UP (ref 3.5–5)
POTASSIUM SERPL-MCNC: 4.5 MMOL/L — SIGNIFICANT CHANGE UP (ref 3.5–5)
POTASSIUM SERPL-SCNC: 4.3 MMOL/L — SIGNIFICANT CHANGE UP (ref 3.5–5)
POTASSIUM SERPL-SCNC: 4.5 MMOL/L — SIGNIFICANT CHANGE UP (ref 3.5–5)
PROT SERPL-MCNC: 5.9 G/DL — LOW (ref 6–8)
PROT SERPL-MCNC: 6.7 G/DL — SIGNIFICANT CHANGE UP (ref 6–8)
PROT UR-MCNC: SIGNIFICANT CHANGE UP
PROTHROM AB SERPL-ACNC: 11.2 SEC — SIGNIFICANT CHANGE UP (ref 9.95–12.87)
RBC # BLD: 3.22 M/UL — LOW (ref 4.2–5.4)
RBC # FLD: 12.8 % — SIGNIFICANT CHANGE UP (ref 11.5–14.5)
SARS-COV-2 RNA SPEC QL NAA+PROBE: SIGNIFICANT CHANGE UP
SODIUM SERPL-SCNC: 136 MMOL/L — SIGNIFICANT CHANGE UP (ref 135–146)
SODIUM SERPL-SCNC: 141 MMOL/L — SIGNIFICANT CHANGE UP (ref 135–146)
SP GR SPEC: 1.04 — HIGH (ref 1.01–1.03)
TROPONIN T SERPL-MCNC: <0.01 NG/ML — SIGNIFICANT CHANGE UP
UROBILINOGEN FLD QL: SIGNIFICANT CHANGE UP
WBC # BLD: 9.92 K/UL — SIGNIFICANT CHANGE UP (ref 4.8–10.8)
WBC # FLD AUTO: 9.92 K/UL — SIGNIFICANT CHANGE UP (ref 4.8–10.8)

## 2022-03-25 PROCEDURE — 99223 1ST HOSP IP/OBS HIGH 75: CPT

## 2022-03-25 PROCEDURE — 70450 CT HEAD/BRAIN W/O DYE: CPT | Mod: 26,MA

## 2022-03-25 PROCEDURE — 72125 CT NECK SPINE W/O DYE: CPT | Mod: 26,MA

## 2022-03-25 PROCEDURE — 73552 X-RAY EXAM OF FEMUR 2/>: CPT | Mod: 26,LT

## 2022-03-25 PROCEDURE — 93010 ELECTROCARDIOGRAM REPORT: CPT

## 2022-03-25 PROCEDURE — 73502 X-RAY EXAM HIP UNI 2-3 VIEWS: CPT | Mod: 26,LT

## 2022-03-25 PROCEDURE — 71045 X-RAY EXAM CHEST 1 VIEW: CPT | Mod: 26

## 2022-03-25 PROCEDURE — 71260 CT THORAX DX C+: CPT | Mod: 26,MA

## 2022-03-25 PROCEDURE — 73562 X-RAY EXAM OF KNEE 3: CPT | Mod: 26,LT

## 2022-03-25 PROCEDURE — 73700 CT LOWER EXTREMITY W/O DYE: CPT | Mod: 26,LT,MA

## 2022-03-25 PROCEDURE — 73120 X-RAY EXAM OF HAND: CPT | Mod: 26,LT

## 2022-03-25 PROCEDURE — 93306 TTE W/DOPPLER COMPLETE: CPT | Mod: 26

## 2022-03-25 PROCEDURE — 74177 CT ABD & PELVIS W/CONTRAST: CPT | Mod: 26,MA

## 2022-03-25 RX ORDER — MORPHINE SULFATE 50 MG/1
4 CAPSULE, EXTENDED RELEASE ORAL ONCE
Refills: 0 | Status: DISCONTINUED | OUTPATIENT
Start: 2022-03-25 | End: 2022-03-25

## 2022-03-25 RX ORDER — MORPHINE SULFATE 50 MG/1
2 CAPSULE, EXTENDED RELEASE ORAL EVERY 6 HOURS
Refills: 0 | Status: DISCONTINUED | OUTPATIENT
Start: 2022-03-25 | End: 2022-03-29

## 2022-03-25 RX ORDER — SENNA PLUS 8.6 MG/1
2 TABLET ORAL
Refills: 0 | Status: DISCONTINUED | OUTPATIENT
Start: 2022-03-25 | End: 2022-03-31

## 2022-03-25 RX ORDER — SODIUM CHLORIDE 9 MG/ML
1000 INJECTION, SOLUTION INTRAVENOUS
Refills: 0 | Status: DISCONTINUED | OUTPATIENT
Start: 2022-03-25 | End: 2022-03-25

## 2022-03-25 RX ORDER — HYDROMORPHONE HYDROCHLORIDE 2 MG/ML
0.5 INJECTION INTRAMUSCULAR; INTRAVENOUS; SUBCUTANEOUS
Refills: 0 | Status: DISCONTINUED | OUTPATIENT
Start: 2022-03-25 | End: 2022-03-25

## 2022-03-25 RX ORDER — LACTULOSE 10 G/15ML
10 SOLUTION ORAL
Refills: 0 | Status: DISCONTINUED | OUTPATIENT
Start: 2022-03-25 | End: 2022-03-25

## 2022-03-25 RX ORDER — KETOROLAC TROMETHAMINE 30 MG/ML
30 SYRINGE (ML) INJECTION ONCE
Refills: 0 | Status: DISCONTINUED | OUTPATIENT
Start: 2022-03-25 | End: 2022-03-25

## 2022-03-25 RX ORDER — ONDANSETRON 8 MG/1
4 TABLET, FILM COATED ORAL ONCE
Refills: 0 | Status: COMPLETED | OUTPATIENT
Start: 2022-03-25 | End: 2022-03-25

## 2022-03-25 RX ORDER — VALSARTAN 80 MG/1
80 TABLET ORAL DAILY
Refills: 0 | Status: DISCONTINUED | OUTPATIENT
Start: 2022-03-25 | End: 2022-03-26

## 2022-03-25 RX ORDER — ACETAMINOPHEN 500 MG
650 TABLET ORAL EVERY 6 HOURS
Refills: 0 | Status: DISCONTINUED | OUTPATIENT
Start: 2022-03-25 | End: 2022-03-25

## 2022-03-25 RX ORDER — SENNA PLUS 8.6 MG/1
2 TABLET ORAL
Qty: 0 | Refills: 0 | DISCHARGE

## 2022-03-25 RX ORDER — POLYETHYLENE GLYCOL 3350 17 G/17G
17 POWDER, FOR SOLUTION ORAL DAILY
Refills: 0 | Status: DISCONTINUED | OUTPATIENT
Start: 2022-03-25 | End: 2022-03-25

## 2022-03-25 RX ORDER — VALSARTAN 80 MG/1
80 TABLET ORAL DAILY
Refills: 0 | Status: DISCONTINUED | OUTPATIENT
Start: 2022-03-25 | End: 2022-03-25

## 2022-03-25 RX ORDER — MORPHINE SULFATE 50 MG/1
2 CAPSULE, EXTENDED RELEASE ORAL EVERY 6 HOURS
Refills: 0 | Status: DISCONTINUED | OUTPATIENT
Start: 2022-03-25 | End: 2022-03-25

## 2022-03-25 RX ORDER — ENOXAPARIN SODIUM 100 MG/ML
40 INJECTION SUBCUTANEOUS EVERY 24 HOURS
Refills: 0 | Status: DISCONTINUED | OUTPATIENT
Start: 2022-03-26 | End: 2022-03-31

## 2022-03-25 RX ORDER — ACETAMINOPHEN 500 MG
650 TABLET ORAL EVERY 6 HOURS
Refills: 0 | Status: DISCONTINUED | OUTPATIENT
Start: 2022-03-25 | End: 2022-03-31

## 2022-03-25 RX ORDER — LACTULOSE 10 G/15ML
10 SOLUTION ORAL
Refills: 0 | Status: DISCONTINUED | OUTPATIENT
Start: 2022-03-25 | End: 2022-03-28

## 2022-03-25 RX ORDER — DOCUSATE SODIUM 100 MG
3 CAPSULE ORAL
Qty: 0 | Refills: 0 | DISCHARGE

## 2022-03-25 RX ORDER — POLYETHYLENE GLYCOL 3350 17 G/17G
17 POWDER, FOR SOLUTION ORAL DAILY
Refills: 0 | Status: DISCONTINUED | OUTPATIENT
Start: 2022-03-25 | End: 2022-03-28

## 2022-03-25 RX ORDER — PREGABALIN 225 MG/1
1 CAPSULE ORAL
Qty: 0 | Refills: 0 | DISCHARGE

## 2022-03-25 RX ORDER — SODIUM CHLORIDE 9 MG/ML
1000 INJECTION, SOLUTION INTRAVENOUS ONCE
Refills: 0 | Status: COMPLETED | OUTPATIENT
Start: 2022-03-25 | End: 2022-03-25

## 2022-03-25 RX ORDER — LANOLIN ALCOHOL/MO/W.PET/CERES
3 CREAM (GRAM) TOPICAL AT BEDTIME
Refills: 0 | Status: DISCONTINUED | OUTPATIENT
Start: 2022-03-25 | End: 2022-03-25

## 2022-03-25 RX ORDER — LANOLIN ALCOHOL/MO/W.PET/CERES
3 CREAM (GRAM) TOPICAL AT BEDTIME
Refills: 0 | Status: DISCONTINUED | OUTPATIENT
Start: 2022-03-25 | End: 2022-03-31

## 2022-03-25 RX ORDER — LACTULOSE 10 G/15ML
10 SOLUTION ORAL
Qty: 0 | Refills: 0 | DISCHARGE

## 2022-03-25 RX ORDER — CEFAZOLIN SODIUM 1 G
2000 VIAL (EA) INJECTION EVERY 8 HOURS
Refills: 0 | Status: COMPLETED | OUTPATIENT
Start: 2022-03-25 | End: 2022-03-26

## 2022-03-25 RX ORDER — SENNA PLUS 8.6 MG/1
2 TABLET ORAL
Refills: 0 | Status: DISCONTINUED | OUTPATIENT
Start: 2022-03-25 | End: 2022-03-25

## 2022-03-25 RX ORDER — VALSARTAN 80 MG/1
1 TABLET ORAL
Qty: 0 | Refills: 0 | DISCHARGE

## 2022-03-25 RX ADMIN — MORPHINE SULFATE 4 MILLIGRAM(S): 50 CAPSULE, EXTENDED RELEASE ORAL at 05:18

## 2022-03-25 RX ADMIN — LACTULOSE 10 GRAM(S): 10 SOLUTION ORAL at 06:29

## 2022-03-25 RX ADMIN — Medication 650 MILLIGRAM(S): at 20:15

## 2022-03-25 RX ADMIN — MORPHINE SULFATE 4 MILLIGRAM(S): 50 CAPSULE, EXTENDED RELEASE ORAL at 05:03

## 2022-03-25 RX ADMIN — SENNA PLUS 2 TABLET(S): 8.6 TABLET ORAL at 09:47

## 2022-03-25 RX ADMIN — VALSARTAN 80 MILLIGRAM(S): 80 TABLET ORAL at 06:29

## 2022-03-25 RX ADMIN — POLYETHYLENE GLYCOL 3350 17 GRAM(S): 17 POWDER, FOR SOLUTION ORAL at 14:06

## 2022-03-25 RX ADMIN — MORPHINE SULFATE 2 MILLIGRAM(S): 50 CAPSULE, EXTENDED RELEASE ORAL at 16:20

## 2022-03-25 RX ADMIN — SODIUM CHLORIDE 50 MILLILITER(S): 9 INJECTION, SOLUTION INTRAVENOUS at 21:08

## 2022-03-25 RX ADMIN — SODIUM CHLORIDE 1000 MILLILITER(S): 9 INJECTION, SOLUTION INTRAVENOUS at 01:01

## 2022-03-25 RX ADMIN — SODIUM CHLORIDE 50 MILLILITER(S): 9 INJECTION, SOLUTION INTRAVENOUS at 04:53

## 2022-03-25 RX ADMIN — MORPHINE SULFATE 2 MILLIGRAM(S): 50 CAPSULE, EXTENDED RELEASE ORAL at 21:16

## 2022-03-25 RX ADMIN — Medication 650 MILLIGRAM(S): at 20:30

## 2022-03-25 RX ADMIN — ONDANSETRON 4 MILLIGRAM(S): 8 TABLET, FILM COATED ORAL at 19:03

## 2022-03-25 RX ADMIN — MORPHINE SULFATE 4 MILLIGRAM(S): 50 CAPSULE, EXTENDED RELEASE ORAL at 03:34

## 2022-03-25 RX ADMIN — MORPHINE SULFATE 2 MILLIGRAM(S): 50 CAPSULE, EXTENDED RELEASE ORAL at 09:09

## 2022-03-25 RX ADMIN — MORPHINE SULFATE 2 MILLIGRAM(S): 50 CAPSULE, EXTENDED RELEASE ORAL at 09:39

## 2022-03-25 RX ADMIN — MORPHINE SULFATE 2 MILLIGRAM(S): 50 CAPSULE, EXTENDED RELEASE ORAL at 15:58

## 2022-03-25 RX ADMIN — Medication 3 MILLIGRAM(S): at 21:11

## 2022-03-25 RX ADMIN — MORPHINE SULFATE 2 MILLIGRAM(S): 50 CAPSULE, EXTENDED RELEASE ORAL at 21:31

## 2022-03-25 NOTE — ED PROVIDER NOTE - OBJECTIVE STATEMENT
89 yo female w/ PMH of HTN, OA presents for L hip pain after fall.  States she was walking, next thing she remembered was being on the floor, no blood thinners, unknown LOC, unknown head trauma, unknown down time, unwitnessed. Reporting L hip pain. Denies headache and pain elsewhere.

## 2022-03-25 NOTE — ED ADULT NURSE NOTE - OBJECTIVE STATEMENT
89 y/o female BIBA s/p fall at [facility]. pt baseline ambulatory with walker. fall was unwitnessed. denies HT. pt a&ox3 upon arrival, reporting pain to left hip

## 2022-03-25 NOTE — PRE PROCEDURE NOTE - PRE PROCEDURE EVALUATION
ORTHOPEDIC SURGERY PRE OP NOTE      Diagnosis: Left Intertrochanteric Hip Fracture     Planned Procedure:  Left Hip open reduction and Internal fixation     Consent: TO BE OBTAINED BY ATTENDING                   Risks/benefits/alternatives were discussed with the patient/family and they wish to proceed with surgery.       ANTICIPATED DATE OF PROCEDURE : 3/25/2022  SCHEDULED CASE OR ADD-ON CASE: Add-on       Consent:     Clearances:   [   ] medicine pending     T(C): 36.4 (03-25-22 @ 05:00), Max: 36.6 (03-24-22 @ 23:33)  HR: 76 (03-25-22 @ 05:00) (75 - 83)  BP: 162/79 (03-25-22 @ 05:00) (126/59 - 192/80)  RR: 18 (03-25-22 @ 05:00) (18 - 18)  SpO2: 95% (03-25-22 @ 05:00) (95% - 99%)    Labs:                        12.8   10.92 )-----------( 276      ( 24 Mar 2022 23:33 )             39.3     03-24    136  |  105  |  16  ----------------------------<  152<H>  4.3   |  22  |  0.8    Ca    9.3      24 Mar 2022 23:33    TPro  6.7  /  Alb  3.8  /  TBili  0.3  /  DBili  x   /  AST  20  /  ALT  12  /  AlkPhos  74  03-24    PT/INR - ( 24 Mar 2022 23:33 )   PT: 11.20 sec;   INR: 0.97 ratio         PTT - ( 24 Mar 2022 23:33 )  PTT:26.4 sec  Type and Screen X 2:    COVID-19 PCR: NotDetec (24 Mar 2022 23:40)  COVID-19 PCR: NotDetec (05 Nov 2021 20:04)    [ ]Pregnancy test ( if female of childbearing age) : na  [ ]EKG:   [ ]CXR:       DIET: NPO after midnight  IVF: per primary team      ANTICOAGULATION STATUS ( include name of anticoagulant) :  Hold LVX, pending operative intervention                                          A/P: Patient is a 89y y/o Female Pending Left hip ORIF on 3/25/22     [ ] -NPO and IVF @ midnight  [ ]pain control/analgesia prn per primary team   [ ]Incentive Spirometry   [ ]F/U Clearance  [ ]F/U Pending Labs  [ ]Notify Ortho with any questions- spectra [id number]      [ ]Date and Time DISCUSSED WITH PRIMARY TEAM MEMBER: Dr. Johnathon Quick at 755AM on 3/25/22

## 2022-03-25 NOTE — CONSULT NOTE ADULT - ATTENDING COMMENTS
pt has displaced left IT fracture  will undergo ORIF  risks and benefits discussed.   consent from daughter

## 2022-03-25 NOTE — ED ADULT NURSE NOTE - CHIEF COMPLAINT QUOTE
EMS called notification for r/o Left hip fracture post unwitnessed Fall at [facility]. Fall from standing/walker. Pt Alert and oriented x3. baseline ambulatory with walker. Pt stated she was walking and the next thing she woke up on the ground. Laceration to left pointer finger. No hematoma noted to head.

## 2022-03-25 NOTE — BRIEF OPERATIVE NOTE - OPERATION/FINDINGS
displaced left IT fracture displaced left IT fracture  skin tear occurred during application of boots for fracture table  cleansed and dressed  sutured loosely after hip was fixed

## 2022-03-25 NOTE — H&P ADULT - NSICDXPASTMEDICALHX_GEN_ALL_CORE_FT
PAST MEDICAL HISTORY:  Constipation     GERD (gastroesophageal reflux disease)     OA (osteoarthritis)     Osteoporosis

## 2022-03-25 NOTE — H&P ADULT - NSHPPHYSICALEXAM_GEN_ALL_CORE
PHYSICAL EXAM:  GENERAL: frail elderly NAD, lying in bed comfortably  HEAD:  Atraumatic, Normocephalic  EYES: EOMI, conjunctiva and sclera clear  ENT: Moist mucous membranes  NECK: Supple, No JVD  CHEST/LUNG: audible bilateral breath sounds  HEART: Regular rate and rhythm; No murmurs, rubs, or gallops  ABDOMEN: Bowel sounds present; Soft, Nontender, Nondistended   EXTREMITIES:  no LE edema.  NERVOUS SYSTEM:  Alert & Oriented X3, speech clear. No deficits   MSK: LLE shortened and externally rotated, ROM limited by pain

## 2022-03-25 NOTE — PATIENT PROFILE ADULT - FALL HARM RISK - TYPE OF ASSESSMENT
Abdominal Pain: Care Instructions  Your Care Instructions    Abdominal pain has many possible causes. Some aren't serious and get better on their own in a few days. Others need more testing and treatment. If your pain continues or gets worse, you need to be rechecked and may need more tests to find out what is wrong. You may need surgery to correct the problem. Don't ignore new symptoms, such as fever, nausea and vomiting, urination problems, pain that gets worse, and dizziness. These may be signs of a more serious problem. Your doctor may have recommended a follow-up visit in the next 8 to 12 hours. If you are not getting better, you may need more tests or treatment. The doctor has checked you carefully, but problems can develop later. If you notice any problems or new symptoms, get medical treatment right away. Follow-up care is a key part of your treatment and safety. Be sure to make and go to all appointments, and call your doctor if you are having problems. It's also a good idea to know your test results and keep a list of the medicines you take. How can you care for yourself at home? · Rest until you feel better. · To prevent dehydration, drink plenty of fluids, enough so that your urine is light yellow or clear like water. Choose water and other caffeine-free clear liquids until you feel better. If you have kidney, heart, or liver disease and have to limit fluids, talk with your doctor before you increase the amount of fluids you drink. · If your stomach is upset, eat mild foods, such as rice, dry toast or crackers, bananas, and applesauce. Try eating several small meals instead of two or three large ones. · Wait until 48 hours after all symptoms have gone away before you have spicy foods, alcohol, and drinks that contain caffeine. · Do not eat foods that are high in fat. · Avoid anti-inflammatory medicines such as aspirin, ibuprofen (Advil, Motrin), and naproxen (Aleve).  These can cause stomach upset. Talk to your doctor if you take daily aspirin for another health problem. When should you call for help? Call 911 anytime you think you may need emergency care. For example, call if:  ? · You passed out (lost consciousness). ? · You pass maroon or very bloody stools. ? · You vomit blood or what looks like coffee grounds. ? · You have new, severe belly pain. ?Call your doctor now or seek immediate medical care if:  ? · Your pain gets worse, especially if it becomes focused in one area of your belly. ? · You have a new or higher fever. ? · Your stools are black and look like tar, or they have streaks of blood. ? · You have unexpected vaginal bleeding. ? · You have symptoms of a urinary tract infection. These may include:  ¨ Pain when you urinate. ¨ Urinating more often than usual.  ¨ Blood in your urine. ? · You are dizzy or lightheaded, or you feel like you may faint. ? Watch closely for changes in your health, and be sure to contact your doctor if:  ? · You are not getting better after 1 day (24 hours). Where can you learn more? Go to http://[facility].info/. Enter M458 in the search box to learn more about \"Abdominal Pain: Care Instructions. \"  Current as of: March 20, 2017  Content Version: 11.4  © 5468-8180 [employer]. Care instructions adapted under license by [employer] (which disclaims liability or warranty for this information). If you have questions about a medical condition or this instruction, always ask your healthcare professional. [place] [address] any warranty or liability for your use of this information. Nausea and Vomiting: Care Instructions  Your Care Instructions    When you are nauseated, you may feel weak and sweaty and notice a lot of saliva in your mouth. Nausea often leads to vomiting.  Most of the time you do not need to worry about nausea and vomiting, but they can be signs of other illnesses. Two common causes of nausea and vomiting are stomach flu and food poisoning. Nausea and vomiting from viral stomach flu will usually start to improve within 24 hours. Nausea and vomiting from food poisoning may last from 12 to 48 hours. The doctor has checked you carefully, but problems can develop later. If you notice any problems or new symptoms, get medical treatment right away. Follow-up care is a key part of your treatment and safety. Be sure to make and go to all appointments, and call your doctor if you are having problems. It's also a good idea to know your test results and keep a list of the medicines you take. How can you care for yourself at home? · To prevent dehydration, drink plenty of fluids, enough so that your urine is light yellow or clear like water. Choose water and other caffeine-free clear liquids until you feel better. If you have kidney, heart, or liver disease and have to limit fluids, talk with your doctor before you increase the amount of fluids you drink. · Rest in bed until you feel better. · When you are able to eat, try clear soups, mild foods, and liquids until all symptoms are gone for 12 to 48 hours. Other good choices include dry toast, crackers, cooked cereal, and gelatin dessert, such as Jell-O. When should you call for help? Call 911 anytime you think you may need emergency care. For example, call if:  ? · You passed out (lost consciousness). ?Call your doctor now or seek immediate medical care if:  ? · You have symptoms of dehydration, such as:  ¨ Dry eyes and a dry mouth. ¨ Passing only a little dark urine. ¨ Feeling thirstier than usual.   ? · You have new or worsening belly pain. ? · You have a new or higher fever. ? · You vomit blood or what looks like coffee grounds. ? Watch closely for changes in your health, and be sure to contact your doctor if:  ? · You have ongoing nausea and vomiting. ? · Your vomiting is getting worse.    ? · Your vomiting lasts longer than 2 days. ? · You are not getting better as expected. Where can you learn more? Go to http://balbina-te.info/. Enter 25 [id number] in the search box to learn more about \"Nausea and Vomiting: Care Instructions. \"  Current as of: March 20, 2017  Content Version: 11.4  © 4407-4988 [employer]. Care instructions adapted under license by [employer] (which disclaims liability or warranty for this information). If you have questions about a medical condition or this instruction, always ask your healthcare professional. [place] [address] any warranty or liability for your use of this information. Admission

## 2022-03-25 NOTE — ED PROVIDER NOTE - PHYSICAL EXAMINATION
CONSTITUTIONAL: NAD. Speaking in full sentences, unable to move LLE   SKIN: No lacerations or abrasions.  HEAD: NCAT  EYES: PERRLA, EOMI  NECK: No posterior midline cervical tenderness  CARD: +S1, S2 no murmurs, gallops, or rubs. Regular rate and rhythm. Radial, DP, PT 2+/4 bilaterally  RESP: LCTAB. No wheezes, rales or rhonchi.  ABD: Abdomen soft, nontender, nondistended.  NEURO: Alert, oriented, grossly unremarkable. Strength 5/5 in bilateral UEs and RLE. Limited LLE strength assessment due to pain. Follows commands.  MSK: Deformity noted over L hip, LLE shortened and externally rotated. TTP over L hip. No other TTP in UE and LEs. No chest wall tenderness or crepitus  BACK: No posterior midline tenderness  PSYCH: Cooperative, appropriate.

## 2022-03-25 NOTE — H&P ADULT - ASSESSMENT
89 year old female with PMH of constipation, depression, GERD, gout, HTN, OA, and osteoporosis presented to the ED after fall.     # Fall, unclear etiology   # L intertrochanteric fracture  - Trauma work up was remarkable for acute comminuted mildly displaced L intertrochanteric femur fracture  - NWB LLE   - keep NPO for possible ortho intervention   - start LR at 50cc/hr while NPO   - hold lovenox for possible surgical intervention, ortho recommends lovenox 40mg daily at 2200  - pain control   - will need medical clearance for OR.     # HTN   - hypertensive urgency in the ED likely secondary to pain  - continue with valsaratan 80mg PO daily and atenolol     # new mild R-sided hydronephrosis with 3 reteral calculi, measuring 3-4mm in size noted on CT   - obtain renal bladder US   - consider flomax   - if worsening or Cr rising consider urology consult     # Constipation - continue with lactulose   # GERD - continue with protonix  89 year old female with PMH of constipation, depression, GERD, gout, HTN, OA, and osteoporosis presented to the ED after fall.     # L intertrochanteric fracture secondary to fall, unclear etiology  # Osteoporosis   - Trauma work up was remarkable for acute comminuted mildly displaced L intertrochanteric femur fracture  - NWB LLE, bed rest for now   - keep NPO for possible ortho intervention   - start LR at 50cc/hr while NPO   - hold lovenox for possible surgical intervention, ortho recommends lovenox 40mg daily at 2200  - pain control   - ortho for possible OR   - will need medical clearance for OR.     # HTN   # hypertensive urgency in the ED likely secondary to pain  - continue with valsaratan 80mg PO daily and pain control    # new mild R-sided hydronephrosis with 3 reteral calculi, measuring 3-4mm in size noted on CT   - obtain renal bladder US   - consider flomax   - if worsening or Cr rising consider urology consult     # Constipation - continue with senna and lactulose. add miralax while inpatient.  # GERD - takes xochitl-lanta at [facility], start maalox PRN while inpatient     # Activity: bed rest for now   # DVT ppx: lovenox if no surgery  # GI ppx: not indicated   # Diet: DASH, NPO for now for possible surgery  # Dispo: acute, from [facility]   # Code status: DNR/DNI - paperwork from NH in chart, confirmed with patient's daughters and patient.  89 year old female with PMH of constipation, depression, GERD, gout, HTN, OA, and osteoporosis presented to the ED after fall.     # L intertrochanteric fracture secondary to fall, unclear etiology  # Osteoporosis   - Trauma work up was remarkable for acute comminuted mildly displaced L intertrochanteric femur fracture  - NWB LLE, bed rest for now   - keep NPO for possible ortho intervention   - start LR at 50cc/hr while NPO   - hold lovenox for possible surgical intervention, ortho recommends lovenox 40mg daily at 2200  - pain control   - ortho for possible OR   - will need medical clearance for OR.     # HTN   # hypertensive urgency in the ED likely secondary to pain  - continue with valsaratan 80mg PO daily and pain control    # new mild R-sided hydronephrosis with 3 ureteral calculi, measuring 3-4mm in size noted on CT   - obtain renal bladder US   - consider flomax   - if worsening or Cr rising consider urology consult     # Constipation - continue with senna and lactulose. add miralax while inpatient.  # GERD - takes xochitl-lanta at [facility], start maalox PRN while inpatient     # Activity: bed rest for now   # DVT ppx: lovenox if no surgery  # GI ppx: not indicated   # Diet: DASH, NPO for now for possible surgery  # Dispo: acute, from [facility]   # Code status: DNR/DNI - paperwork from NH in chart, confirmed with patient's daughters and patient.

## 2022-03-25 NOTE — CONSULT NOTE ADULT - SUBJECTIVE AND OBJECTIVE BOX
89y Female home ambulatory with a walker presents c/o L hip pain and inability to ambulate sp fall, patient does not recall whether fall was mechanical or syncopal. Denies HS/LOC. Denies numbness/tingling. Denies fever/chills. Denies pain/injury elsewhere.       PAST MEDICAL & SURGICAL HISTORY:    MEDICATIONS  (STANDING):    Allergies    No Known Allergies    Intolerances        Vital Signs Last 24 Hrs  T(C): 36.6 (03-24-22 @ 23:33), Max: 36.6 (03-24-22 @ 23:33)  T(F): 97.9 (03-24-22 @ 23:33), Max: 97.9 (03-24-22 @ 23:33)  HR: 83 (03-24-22 @ 23:33) (83 - 83)  BP: 192/80 (03-24-22 @ 23:33) (192/80 - 192/80)  BP(mean): --  RR: 18 (03-24-22 @ 23:33) (18 - 18)  SpO2: 99% (03-24-22 @ 23:33) (99% - 99%)  Physical Exam  General: NAD, Alert, Awake and oriented  Neurologic: No gross deficits, moving all 4s.  Head: NCAT without abrasions, lacerations, or ecchymosis to head, face, or scalp  Eyes: PERRL  Neck/C-Spine: FAROM. No bony TTP.  T/L Spine: No bony TTP, no palpable step-off.    HIPS and PELVIS: Unable to SLR ***Hip.     RUE:  No open skin or wounds  NTTP shoulder, upper arm, elbow, forearm, wrist or hand  Full baseline painless ROM at shoulder, elbow, wrist, and   SILT in axillary, musculocutaneous,  radial, median, and ulnar distributions.   AIN/PIN/U motor intact  2+ radial pulse with brisk cap refill at distal finger tips.   Compartments soft and compressible.    LUE:  Superficial skin tear on patients index DIP   NTTP shoulder, upper arm, elbow, forearm, wrist or hand  Full baseline painless ROM at shoulder, elbow, wrist, and   SILT in axillary, musculocutaneous,  radial, median, and ulnar distributions.   AIN/PIN/U motor intact  2+ radial pulse with brisk cap refill at distal finger tips.   Compartments soft and compressible.    RLE:  No open skin or wounds  NTTP hip, thigh, knee, leg, ankle or foot.   Full baseline painless ROM at hip, knee, ankle and toes   No pain with log-roll or axial compression  Able to actively SLR.  SILT DP/SP/T/Barber/Sa.   EHL/FHL/TA/Gs motor intact.  2+ DP/PT pulses with brisk cap refill distally.  Compartments soft and compressible.   No pain on passive stretch.    LLE:   No open skin or wounds  TTP at the hip   ROM limited secondary to hip pain   Pain with log-roll or axial compression  UNable to actively SLR.  SILT DP/SP/T/Barber/Sa.   EHL/FHL/TA/Gs motor intact.  2+ DP/PT pulses with brisk cap refill distally.  Compartments soft and compressible.   No pain on passive stretch.                          12.8   10.92 )-----------( 276      ( 24 Mar 2022 23:33 )             39.3     24 Mar 2022 23:33    136    |  105    |  16     ----------------------------<  152    4.3     |  22     |  0.8      Ca    9.3        24 Mar 2022 23:33    TPro  6.7    /  Alb  3.8    /  TBili  0.3    /  DBili  x      /  AST  20     /  ALT  12     /  AlkPhos  74     24 Mar 2022 23:33    PT/INR - ( 24 Mar 2022 23:33 )   PT: 11.20 sec;   INR: 0.97 ratio         PTT - ( 24 Mar 2022 23:33 )  PTT:26.4 sec  Imaging: XR demonstates L. IT hip fracture    A/P: 89y Female with L. hip fracture. Discussed [facility] daughter, family and patient  desire surgical management. PAtient added on for surgical management today, 3/25. Recommend local wound care with bacitracin for Left index finger laceration.     Family contact: Rabia: [phone], Velia: [phone]   Admit to medical team  Pain control  NWBLLE  FU labs/imaging  Medical clearance/optimization for OR  NPO/IVF at midnight  Glucose control  DVT PPX, Recommend LVX 40mg daily at 2200., hold LVX for possible surgical intervention.   Trend H/H. Transfuse above 10/30.  Plan for OR once medically cleared.

## 2022-03-25 NOTE — ED ADULT NURSE NOTE - NSIMPLEMENTINTERV_GEN_ALL_ED
Implemented All Fall with Harm Risk Interventions:  [place] to call system. Call bell, personal items and telephone within reach. Instruct patient to call for assistance. Room bathroom lighting operational. Non-slip footwear when patient is off stretcher. Physically safe environment: no spills, clutter or unnecessary equipment. Stretcher in lowest position, wheels locked, appropriate side rails in place. Provide visual cue, wrist band, yellow gown, etc. Monitor gait and stability. Monitor for mental status changes and reorient to person, place, and time. Review medications for side effects contributing to fall risk. Reinforce activity limits and safety measures with patient and family. Provide visual clues: red socks.

## 2022-03-25 NOTE — ED PROVIDER NOTE - ATTENDING CONTRIBUTION TO CARE
89-year-old female past medical history of hypertension, osteoarthritis presents with left hip pain.  Patient states she was ambulating and does not member what happened but woke up on the floor with pain in her left hip.  Unknown head trauma, unknown LOC, patient does not take anticoagulation.  Patient complaining of left hip pain.  No nausea/vomiting/diarrhea, no chest pain, shortness of breath, no palpitations, no dizziness, no lightheadedness, no nausea/vomiting/diarrhea, no other extremity pain, no numbness/tingling, no vision change.    CONSTITUTIONAL: Well-developed; well-nourished; in no acute distress. Sitting up and providing appropriate history and physical examination  SKIN: skin exam is warm and dry, no acute rash.  HEAD: Normocephalic; atraumatic.  EYES: PERRL, 3 mm bilateral, no nystagmus, EOM intact; conjunctiva and sclera clear.  ENT: No nasal discharge; airway clear.  NECK: Supple; non tender. + full passive ROM in all directions. No JVD  CARD: S1, S2 normal; no murmurs, gallops, or rubs. Regular rate and rhythm. + Symmetric Strong Pulses  RESP: No wheezes, rales or rhonchi. Good air movement bilaterally  ABD: soft; non-distended; non-tender. No Rebound, No Guarding, No signs of peritonitis, No CVA tenderness. No pulsatile abdominal mass. + Strong and Symmetric Pulses  EXT: + TTP over left lateral hip with left lower extremity shortening and external rotation.  Normal ROM. No clubbing, cyanosis or edema. Dp and Pt Pulses intact. Cap refill less than 3 seconds  NEURO: CN 2-12 intact, normal finger to nose, normal romberg, no sensory or motor deficits, Alert, oriented, grossly unremarkable. No Focal deficits. GCS 15. NIH 0  PSYCH: Cooperative, appropriate.

## 2022-03-25 NOTE — CHART NOTE - NSCHARTNOTEFT_GEN_A_CORE
PACU ANESTHESIA ADMISSION NOTE    Procedure: ORIF of femur using Gamma nail    Post op diagnosis:  Intertrochanteric fracture of left hip    __x__  Patent Airway    _x___  Full return of protective reflexes    _x___  Full recovery from anesthesia / back to baseline status    Vitals:  T(C): 98.2 F  HR: 87  BP: 138/91  RR: 18  SpO2: 98%    Mental Status:  _x___ Awake   __x___ Alert   _____ Drowsy   _____ Sedated    Nausea/Vomiting:  _x___ NO  ______Yes,   See Post - Op Orders          Pain Scale (0-10):  _____    Treatment: ____ None    __x__ See Post - Op/PCA Orders    Post - Operative Fluids:   ____ Oral   __x__ See Post - Op Orders    Plan: Discharge:   ____Home       _x___Floor     _____Critical Care    _____  Other:_________________    Comments: uneventful anesthesia course no complications. Vitals stable. Pt transferred to PACU. Transfer to floor when criteria is met

## 2022-03-25 NOTE — PROGRESS NOTE ADULT - SUBJECTIVE AND OBJECTIVE BOX
SUBJECTIVE:    Patient is a 89y old Female who presents with a chief complaint of left hip fracture.    Overnight Events: Patient was admitted overnight. NWB over the left hip, patient is pending clearance from medicine to go to OR today.  Patient is receiving IV fluids over here, NPO before procedure, no active complaints except for pain in the left hip.  Patient reports no overt fluid loss, no vomiting, no diarrhea, no features of vasovagal syncope, no chest pain, no palpitations; patient might need cardiovascular assessment as patient unaware how she fell.    PAST MEDICAL & SURGICAL HISTORY  Constipation    GERD (gastroesophageal reflux disease)    Osteoporosis    OA (osteoarthritis)      SOCIAL HISTORY:  Negative for smoking/alcohol/drug use.     ALLERGIES:  No Known Allergies    MEDICATIONS:  STANDING MEDICATIONS  lactated ringers. 1000 milliLiter(s) IV Continuous <Continuous>  lactulose Syrup 10 Gram(s) Oral two times a day  polyethylene glycol 3350 17 Gram(s) Oral daily  senna 2 Tablet(s) Oral <User Schedule>  valsartan 80 milliGRAM(s) Oral daily    PRN MEDICATIONS  acetaminophen     Tablet .. 650 milliGRAM(s) Oral every 6 hours PRN  aluminum hydroxide/magnesium hydroxide/simethicone Suspension 30 milliLiter(s) Oral every 6 hours PRN  melatonin 3 milliGRAM(s) Oral at bedtime PRN  morphine  - Injectable 2 milliGRAM(s) IV Push every 6 hours PRN    VITALS:   T(F): 97.6, Max: 97.9 (- @ 23:33)  HR: 76 (75 - 83)  BP: 162/79 (126/59 - 192/80)  RR: 18 (18 - 18)  SpO2: 95% (95% - 99%)    LABS:                        12.8   10.92 )-----------( 276      ( 24 Mar 2022 23:33 )             39.3         136  |  105  |  16  ----------------------------<  152<H>  4.3   |  22  |  0.8    Ca    9.3      24 Mar 2022 23:33    TPro  6.7  /  Alb  3.8  /  TBili  0.3  /  DBili  x   /  AST  20  /  ALT  12  /  AlkPhos  74      PT/INR - ( 24 Mar 2022 23:33 )   PT: 11.20 sec;   INR: 0.97 ratio         PTT - ( 24 Mar 2022 23:33 )  PTT:26.4 sec  Urinalysis Basic - ( 25 Mar 2022 04:55 )    Color: Light Yellow / Appearance: Clear / S.036 / pH: x  Gluc: x / Ketone: Negative  / Bili: Negative / Urobili: <2 mg/dL   Blood: x / Protein: Trace / Nitrite: Negative   Leuk Esterase: Negative / RBC: x / WBC x   Sq Epi: x / Non Sq Epi: x / Bacteria: x        Lactate, Blood: 2.4 mmol/L *H* (22 @ 23:33)  Creatine Kinase, Serum: 56 U/L (22 @ 23:33)  Troponin T, Serum: <0.01 ng/mL (22 @ 23:33)      CARDIAC MARKERS ( 24 Mar 2022 23:33 )  x     / <0.01 ng/mL / 56 U/L / x     / x              22 @ 07:01  -  22 @ 07:00  --------------------------------------------------------  IN: 0 mL / OUT: 500 mL / NET: -500 mL          IMAGING/EKG:    No imaging except the one done in the ED    PHYSICAL EXAM:  GEN: NAD  LUNGS: CTAB  HEART: RRR  ABD: soft, NT/ND, +BS  EXT: no edema, no movement of her left lower extremity, it is externally rotated and shortened  NEURO: AAOX3

## 2022-03-25 NOTE — H&P ADULT - NSHPLABSRESULTS_GEN_ALL_CORE
LABS:  03-24 @ 23:33 Creatine 56 U/L [0 - 225]  cret                        12.8   10.92 )-----------( 276      ( 24 Mar 2022 23:33 )             39.3     03-24    136  |  105  |  16  ----------------------------<  152<H>  4.3   |  22  |  0.8    Ca    9.3      24 Mar 2022 23:33    TPro  6.7  /  Alb  3.8  /  TBili  0.3  /  DBili  x   /  AST  20  /  ALT  12  /  AlkPhos  74  03-24    PT/INR - ( 24 Mar 2022 23:33 )   PT: 11.20 sec;   INR: 0.97 ratio         PTT - ( 24 Mar 2022 23:33 )  PTT:26.4 sec    < from: CT Head No Cont (03.25.22 @ 00:25) >      Impression:    No CT evidence of acute intracranial hemorrhage or large territorial   infarct.    --- End of Report ---    < end of copied text >    < from: CT Cervical Spine No Cont (03.25.22 @ 00:29) >      Impression:    No CT evidence of acute cervical spine fracture.    --- End of Report ---    < end of copied text >    < from: CT Hip No Cont, Left (03.25.22 @ 00:39) >      IMPRESSION:    Acute comminuted mildly displaced left intertrochanteric femur fracture.    No additional acute traumatic injury is identified to the chest, abdomen   or pelvis.    New mild right-sided hydronephrosis with 3 ureteral calculi, each   measuring 3-4 mm insize as above.    --- End of Report ---    < end of copied text >

## 2022-03-25 NOTE — H&P ADULT - HISTORY OF PRESENT ILLNESS
89 year old female with PMH of constipation, depression, GERD, gout, HTN, OA, and osteoporosis presented to the ED after fall.  89 year old female with PMH of constipation, depression, GERD, gout, HTN, OA, and osteoporosis presented to the ED after fall. Patient is uncertain whether the fall was mechanical or syncopal, however after the fall she complained of L hip pain and was unable to ambulate.     In the ED, patient hypertensive 192/80, vitals otherwise stable. Trauma work up was remarkable for acute comminuted mildly displaced L intertrochanteric femur fracture and new mild R-sided hydronephrosis with 3 reteral calculi, measuring 3-4mm in size. Orthopedics evaluated the patient for possible surgery. Admitted to medicine for further work up and management.  89 year old female with PMH of constipation, depression, GERD, gout, HTN, OA, and osteoporosis presented to the ED after fall. Patient is uncertain why she feel, she was ambulating with her walker and found herself on the floor. After the fall she complained of L hip pain and was unable to ambulate.     In the ED, patient hypertensive 192/80, vitals otherwise stable. Trauma work up was remarkable for acute comminuted mildly displaced L intertrochanteric femur fracture and new mild R-sided hydronephrosis with 3 ureteral calculi, measuring 3-4mm in size. Orthopedics evaluated the patient for possible surgery. Admitted to medicine for further work up and management.

## 2022-03-25 NOTE — ED ADULT NURSE NOTE - SUICIDE SCREENING QUESTION 1
Patient is a 54y old  Female who presents with a chief complaint of chest pain (27 May 2017 05:08)      INTERVAL HPI/OVERNIGHT EVENTS:  T(C): 36.6, Max: 37 (05-29 @ 13:25)  HR: 68 (68 - 93)  BP: 115/74 (100/60 - 115/74)  RR: 12 (12 - 17)  SpO2: 98% (98% - 100%)  Wt(kg): --  I&O's Summary  I & Os for 24h ending 30 May 2017 07:00  =============================================  IN: 540 ml / OUT: 0 ml / NET: 540 ml    I & Os for current day (as of 30 May 2017 13:05)  =============================================  IN: 360 ml / OUT: 0 ml / NET: 360 ml      LABS:                        13.8   8.99  )-----------( 234      ( 30 May 2017 06:30 )             43.2     05-30    140  |  95<L>  |  38<H>  ----------------------------<  104<H>  3.3<L>   |  33<H>  |  1.29    Ca    10.3      30 May 2017 06:30  Mg     2.1     05-30      PT/INR - ( 30 May 2017 06:30 )   PT: 26.8 SEC;   INR: 2.35              CAPILLARY BLOOD GLUCOSE            MEDICATIONS  (STANDING):  furosemide    Tablet 40milliGRAM(s) Oral daily  aspirin enteric coated 81milliGRAM(s) Oral daily  pantoprazole  Injectable 40milliGRAM(s) IV Push every 12 hours  warfarin 5milliGRAM(s) Oral once    MEDICATIONS  (PRN):      REVIEW OF SYSTEMS:  CONSTITUTIONAL: No fever, weight loss, or fatigue  EYES: No eye pain, visual disturbances, or discharge  ENMT:  No difficulty hearing, tinnitus, vertigo; No sinus or throat pain  NECK: No pain or stiffness  RESPIRATORY: No cough, wheezing, chills or hemoptysis; No shortness of breath  CARDIOVASCULAR: No chest pain, palpitations, dizziness, or leg swelling  GASTROINTESTINAL: No abdominal or epigastric pain. No nausea, vomiting, or hematemesis; No diarrhea or constipation. No melena or hematochezia.  GENITOURINARY: No dysuria, frequency, hematuria, or incontinence  NEUROLOGICAL: No headaches, memory loss, loss of strength, numbness, or tremors  SKIN: No itching, burning, rashes, or lesions   LYMPH NODES: No enlarged glands  ENDOCRINE: No heat or cold intolerance; No hair loss  MUSCULOSKELETAL: No joint pain or swelling; No muscle, back, or extremity pain  PSYCHIATRIC: No depression, anxiety, mood swings, or difficulty sleeping  HEME/LYMPH: No easy bruising, or bleeding gums  ALLERY AND IMMUNOLOGIC: No hives or eczema    RADIOLOGY & ADDITIONAL TESTS:    Imaging Personally Reviewed:  [ ] YES  [ ] NO    Consultant(s) Notes Reviewed:  [ ] YES  [ ] NO    PHYSICAL EXAM:  GENERAL: NAD, well-groomed, well-developed  HEAD:  Atraumatic, Normocephalic  EYES: EOMI, PERRLA, conjunctiva and sclera clear  ENMT: No tonsillar erythema, exudates, or enlargement; Moist mucous membranes, Good dentition, No lesions  NECK: Supple, No JVD, Normal thyroid  NERVOUS SYSTEM:  Alert & Oriented X3, Good concentration; Motor Strength 5/5 B/L upper and lower extremities; DTRs 2+ intact and symmetric  CHEST/LUNG: Clear to percussion bilaterally; No rales, rhonchi, wheezing, or rubs  HEART: Regular rate and rhythm; No murmurs, rubs, or gallops  ABDOMEN: Soft, Nontender, Nondistended; Bowel sounds present  EXTREMITIES:  2+ Peripheral Pulses, No clubbing, cyanosis, or edema  LYMPH: No lymphadenopathy noted  SKIN: No rashes or lesions    Care Discussed with Consultants/Other Providers [ *] YES  [ ] NO No

## 2022-03-25 NOTE — ED PROVIDER NOTE - CLINICAL SUMMARY MEDICAL DECISION MAKING FREE TEXT BOX
I personally evaluated the patient. I reviewed the Resident´s or Physician Assistant´s note (as assigned above), and agree with the findings and plan except as documented in my note.  Patient evaluated for fall and left hip pain.  Labs, EKG, chest and pelvis x-ray, left lower extremity x-ray, CT pan scan performed in the ED.  CT left hip performed.  Patient neurovascularly intact, Ortho consulted.  Patient admitted to medicine for further evaluation and treatment.

## 2022-03-25 NOTE — ED PROVIDER NOTE - NS ED ROS FT
Constitutional: No fatigue  Head: No headache  ENT: No visual changes.  Cardiac:  No chest pain or SOB.  Respiratory:  No respiratory distress or hemoptysis.  GI:  No nausea, vomiting, or abdominal pain.  :  No incontinence.  MS:  Reports L hip pain. No back pain.   Neuro:  No paralysis or N/T.  Skin:  No lacerations or abrasions.

## 2022-03-25 NOTE — BRIEF OPERATIVE NOTE - NSICDXBRIEFPREOP_GEN_ALL_CORE_FT
PRE-OP DIAGNOSIS:  Closed displaced intertrochanteric fracture of left femur 25-Mar-2022 17:53:20  Isacc Johns

## 2022-03-25 NOTE — PATIENT PROFILE ADULT - FALL HARM RISK - HARM RISK INTERVENTIONS

## 2022-03-25 NOTE — PRE-ANESTHESIA EVALUATION ADULT - RESPIRATORY RATE (BREATHS/MIN)
18 Detail Level: Detailed Patient Specific Counseling (Will Not Stick From Patient To Patient): -Described cosmetically treating with CRYO. Patient says she would think about it.\\n-Advised that it would be about 100 for up to 10 spots.

## 2022-03-26 LAB
ALBUMIN SERPL ELPH-MCNC: 3 G/DL — LOW (ref 3.5–5.2)
ALP SERPL-CCNC: 60 U/L — SIGNIFICANT CHANGE UP (ref 30–115)
ALT FLD-CCNC: 25 U/L — SIGNIFICANT CHANGE UP (ref 0–41)
ANION GAP SERPL CALC-SCNC: 13 MMOL/L — SIGNIFICANT CHANGE UP (ref 7–14)
AST SERPL-CCNC: 22 U/L — SIGNIFICANT CHANGE UP (ref 0–41)
BASOPHILS # BLD AUTO: 0.03 K/UL — SIGNIFICANT CHANGE UP (ref 0–0.2)
BASOPHILS NFR BLD AUTO: 0.3 % — SIGNIFICANT CHANGE UP (ref 0–1)
BILIRUB SERPL-MCNC: 0.6 MG/DL — SIGNIFICANT CHANGE UP (ref 0.2–1.2)
BUN SERPL-MCNC: 15 MG/DL — SIGNIFICANT CHANGE UP (ref 10–20)
CALCIUM SERPL-MCNC: 8.7 MG/DL — SIGNIFICANT CHANGE UP (ref 8.5–10.1)
CHLORIDE SERPL-SCNC: 102 MMOL/L — SIGNIFICANT CHANGE UP (ref 98–110)
CO2 SERPL-SCNC: 23 MMOL/L — SIGNIFICANT CHANGE UP (ref 17–32)
CREAT SERPL-MCNC: 0.8 MG/DL — SIGNIFICANT CHANGE UP (ref 0.7–1.5)
EGFR: 67 ML/MIN/1.73M2 — SIGNIFICANT CHANGE UP
EOSINOPHIL # BLD AUTO: 0.04 K/UL — SIGNIFICANT CHANGE UP (ref 0–0.7)
EOSINOPHIL NFR BLD AUTO: 0.4 % — SIGNIFICANT CHANGE UP (ref 0–8)
FOLATE SERPL-MCNC: 8 NG/ML — SIGNIFICANT CHANGE UP
GLUCOSE SERPL-MCNC: 111 MG/DL — HIGH (ref 70–99)
HCT VFR BLD CALC: 27.8 % — LOW (ref 37–47)
HGB BLD-MCNC: 9.1 G/DL — LOW (ref 12–16)
IMM GRANULOCYTES NFR BLD AUTO: 0.3 % — SIGNIFICANT CHANGE UP (ref 0.1–0.3)
LYMPHOCYTES # BLD AUTO: 2.68 K/UL — SIGNIFICANT CHANGE UP (ref 1.2–3.4)
LYMPHOCYTES # BLD AUTO: 25.6 % — SIGNIFICANT CHANGE UP (ref 20.5–51.1)
MAGNESIUM SERPL-MCNC: 1.9 MG/DL — SIGNIFICANT CHANGE UP (ref 1.8–2.4)
MCHC RBC-ENTMCNC: 32.7 G/DL — SIGNIFICANT CHANGE UP (ref 32–37)
MCHC RBC-ENTMCNC: 34.7 PG — HIGH (ref 27–31)
MCV RBC AUTO: 106.1 FL — HIGH (ref 81–99)
MONOCYTES # BLD AUTO: 1.31 K/UL — HIGH (ref 0.1–0.6)
MONOCYTES NFR BLD AUTO: 12.5 % — HIGH (ref 1.7–9.3)
NEUTROPHILS # BLD AUTO: 6.36 K/UL — SIGNIFICANT CHANGE UP (ref 1.4–6.5)
NEUTROPHILS NFR BLD AUTO: 60.9 % — SIGNIFICANT CHANGE UP (ref 42.2–75.2)
NRBC # BLD: 0 /100 WBCS — SIGNIFICANT CHANGE UP (ref 0–0)
PLATELET # BLD AUTO: 240 K/UL — SIGNIFICANT CHANGE UP (ref 130–400)
POTASSIUM SERPL-MCNC: 4.8 MMOL/L — SIGNIFICANT CHANGE UP (ref 3.5–5)
POTASSIUM SERPL-SCNC: 4.8 MMOL/L — SIGNIFICANT CHANGE UP (ref 3.5–5)
PROT SERPL-MCNC: 5.4 G/DL — LOW (ref 6–8)
RBC # BLD: 2.62 M/UL — LOW (ref 4.2–5.4)
RBC # FLD: 13 % — SIGNIFICANT CHANGE UP (ref 11.5–14.5)
SODIUM SERPL-SCNC: 138 MMOL/L — SIGNIFICANT CHANGE UP (ref 135–146)
TSH SERPL-MCNC: 0.57 UIU/ML — SIGNIFICANT CHANGE UP (ref 0.27–4.2)
VIT B12 SERPL-MCNC: 535 PG/ML — SIGNIFICANT CHANGE UP (ref 232–1245)
WBC # BLD: 10.45 K/UL — SIGNIFICANT CHANGE UP (ref 4.8–10.8)
WBC # FLD AUTO: 10.45 K/UL — SIGNIFICANT CHANGE UP (ref 4.8–10.8)

## 2022-03-26 PROCEDURE — 76770 US EXAM ABDO BACK WALL COMP: CPT | Mod: 26

## 2022-03-26 PROCEDURE — 99232 SBSQ HOSP IP/OBS MODERATE 35: CPT

## 2022-03-26 PROCEDURE — 71045 X-RAY EXAM CHEST 1 VIEW: CPT | Mod: 26

## 2022-03-26 RX ORDER — VALSARTAN 80 MG/1
40 TABLET ORAL DAILY
Refills: 0 | Status: DISCONTINUED | OUTPATIENT
Start: 2022-03-26 | End: 2022-03-31

## 2022-03-26 RX ORDER — SODIUM CHLORIDE 9 MG/ML
1000 INJECTION, SOLUTION INTRAVENOUS
Refills: 0 | Status: DISCONTINUED | OUTPATIENT
Start: 2022-03-26 | End: 2022-03-28

## 2022-03-26 RX ADMIN — Medication 100 MILLIGRAM(S): at 18:14

## 2022-03-26 RX ADMIN — Medication 650 MILLIGRAM(S): at 19:00

## 2022-03-26 RX ADMIN — Medication 650 MILLIGRAM(S): at 23:08

## 2022-03-26 RX ADMIN — LACTULOSE 10 GRAM(S): 10 SOLUTION ORAL at 05:37

## 2022-03-26 RX ADMIN — Medication 650 MILLIGRAM(S): at 12:00

## 2022-03-26 RX ADMIN — SENNA PLUS 2 TABLET(S): 8.6 TABLET ORAL at 10:44

## 2022-03-26 RX ADMIN — VALSARTAN 40 MILLIGRAM(S): 80 TABLET ORAL at 18:23

## 2022-03-26 RX ADMIN — POLYETHYLENE GLYCOL 3350 17 GRAM(S): 17 POWDER, FOR SOLUTION ORAL at 11:23

## 2022-03-26 RX ADMIN — Medication 100 MILLIGRAM(S): at 03:22

## 2022-03-26 RX ADMIN — MORPHINE SULFATE 2 MILLIGRAM(S): 50 CAPSULE, EXTENDED RELEASE ORAL at 03:25

## 2022-03-26 RX ADMIN — Medication 650 MILLIGRAM(S): at 00:52

## 2022-03-26 RX ADMIN — MORPHINE SULFATE 2 MILLIGRAM(S): 50 CAPSULE, EXTENDED RELEASE ORAL at 09:58

## 2022-03-26 RX ADMIN — Medication 650 MILLIGRAM(S): at 05:42

## 2022-03-26 RX ADMIN — Medication 650 MILLIGRAM(S): at 11:23

## 2022-03-26 RX ADMIN — MORPHINE SULFATE 2 MILLIGRAM(S): 50 CAPSULE, EXTENDED RELEASE ORAL at 03:40

## 2022-03-26 RX ADMIN — SODIUM CHLORIDE 50 MILLILITER(S): 9 INJECTION, SOLUTION INTRAVENOUS at 18:25

## 2022-03-26 RX ADMIN — LACTULOSE 10 GRAM(S): 10 SOLUTION ORAL at 18:14

## 2022-03-26 RX ADMIN — Medication 650 MILLIGRAM(S): at 06:12

## 2022-03-26 RX ADMIN — Medication 100 MILLIGRAM(S): at 11:19

## 2022-03-26 RX ADMIN — Medication 650 MILLIGRAM(S): at 18:14

## 2022-03-26 RX ADMIN — Medication 650 MILLIGRAM(S): at 00:22

## 2022-03-26 RX ADMIN — ENOXAPARIN SODIUM 40 MILLIGRAM(S): 100 INJECTION SUBCUTANEOUS at 05:37

## 2022-03-26 NOTE — PROGRESS NOTE ADULT - SUBJECTIVE AND OBJECTIVE BOX
SUBJECTIVE:    Patient is a 89y old Female who presents with a chief complaint of Hip fracture (25 Mar 2022 11:27)    Overnight Events: No overnight events. Patient reports improved pain over her left hip, is receiving morphine and Tylenol PRN for pain.  Patient underwent ORIF of her left hip yesterday, after obtaining medical clearance since TTE was unremarkable.    PAST MEDICAL & SURGICAL HISTORY  Constipation    GERD (gastroesophageal reflux disease)    Osteoporosis    OA (osteoarthritis)      SOCIAL HISTORY:  Negative for smoking/alcohol/drug use.     ALLERGIES:  No Known Allergies    MEDICATIONS:  STANDING MEDICATIONS  acetaminophen     Tablet .. 650 milliGRAM(s) Oral every 6 hours  ceFAZolin   IVPB 2000 milliGRAM(s) IV Intermittent every 8 hours  enoxaparin Injectable 40 milliGRAM(s) SubCutaneous every 24 hours  lactulose Syrup 10 Gram(s) Oral two times a day  polyethylene glycol 3350 17 Gram(s) Oral daily  senna 2 Tablet(s) Oral <User Schedule>  valsartan 80 milliGRAM(s) Oral daily    PRN MEDICATIONS  aluminum hydroxide/magnesium hydroxide/simethicone Suspension 30 milliLiter(s) Oral every 6 hours PRN  melatonin 3 milliGRAM(s) Oral at bedtime PRN  morphine  - Injectable 2 milliGRAM(s) IV Push every 6 hours PRN    VITALS:   T(F): 97, Max: 98 (- @ 17:25)  HR: 85 (82 - 98)  BP: 105/62 (105/62 - 172/72)  RR: 18 (17 - 20)  SpO2: 95% (95% - 100%)    LABS:                        9.1    10.45 )-----------( 240      ( 26 Mar 2022 04:30 )             27.8         138  |  102  |  15  ----------------------------<  111<H>  4.8   |  23  |  0.8    Ca    8.7      26 Mar 2022 04:30  Mg     1.9         TPro  5.4<L>  /  Alb  3.0<L>  /  TBili  0.6  /  DBili  x   /  AST  22  /  ALT  25  /  AlkPhos  60      PT/INR - ( 24 Mar 2022 23:33 )   PT: 11.20 sec;   INR: 0.97 ratio         PTT - ( 24 Mar 2022 23:33 )  PTT:26.4 sec  Urinalysis Basic - ( 25 Mar 2022 04:55 )    Color: Light Yellow / Appearance: Clear / S.036 / pH: x  Gluc: x / Ketone: Negative  / Bili: Negative / Urobili: <2 mg/dL   Blood: x / Protein: Trace / Nitrite: Negative   Leuk Esterase: Negative / RBC: x / WBC x   Sq Epi: x / Non Sq Epi: x / Bacteria: x            CARDIAC MARKERS ( 24 Mar 2022 23:33 )  x     / <0.01 ng/mL / 56 U/L / x     / x                  IMAGING/EKG:    CXR does not show any infiltrates, but shows large hiatal hernia    PHYSICAL EXAM:  GEN: NAD, comfortable  LUNGS: CTAB  HEART: RRR  ABD: soft, NT/ND, +BS  EXT: no edema, PP b/l  NEURO: AAO x3

## 2022-03-26 NOTE — PROGRESS NOTE ADULT - SUBJECTIVE AND OBJECTIVE BOX
ORTHO POST OP NOTE    Procedure: ORIF of left femur using Gamma nail    Patient seen and examined at bedside. No acute events since OR. Resting without complaints. Pain controlled with medication. Denies chest pain, SOB, N/V.    T(C): 36.1 (03-26-22 @ 05:26), Max: 36.7 (03-25-22 @ 17:25)  HR: 85 (03-26-22 @ 05:26) (76 - 98)  BP: 105/62 (03-26-22 @ 05:26) (105/62 - 172/72)  RR: 18 (03-26-22 @ 05:26) (17 - 20)  SpO2: 95% (03-26-22 @ 05:26) (95% - 100%)  Wt(kg): --    Exam:  Alert and [place], No Acute Distress    LLE  Dressing  C/D/I  Compartments soft/compressible  Calves soft  EHL/FHL Motor intact  SILT distally  Ext wwp                           11.0<L>  9.92  )-----------( 240      ( 25 Mar 2022 11:19 )             33.7<L>     03-25    141  |  107  |  11  ----------------------------<  116<H>  4.5   |  25  |  0.5<L>      PT/INR - ( 24 Mar 2022 23:33 )   PT: 11.20 sec;   INR: 0.97 ratio         PTT - ( 24 Mar 2022 23:33 )  PTT:26.4 sec      A/P: 89yF S/p ORIF of femur using Gamma nail    -Periop antibiotics x 24 hours  -PT/OT  -WBAT  -OOBTC  -F/U AM Labs  -DVT PPx  -Pain Control  -SCDs  -IS  -Continue Current Tx  -Dispo planning

## 2022-03-27 LAB
ALBUMIN SERPL ELPH-MCNC: 3 G/DL — LOW (ref 3.5–5.2)
ALP SERPL-CCNC: 56 U/L — SIGNIFICANT CHANGE UP (ref 30–115)
ALT FLD-CCNC: 11 U/L — SIGNIFICANT CHANGE UP (ref 0–41)
ANION GAP SERPL CALC-SCNC: 10 MMOL/L — SIGNIFICANT CHANGE UP (ref 7–14)
AST SERPL-CCNC: 15 U/L — SIGNIFICANT CHANGE UP (ref 0–41)
BASOPHILS # BLD AUTO: 0.03 K/UL — SIGNIFICANT CHANGE UP (ref 0–0.2)
BASOPHILS NFR BLD AUTO: 0.3 % — SIGNIFICANT CHANGE UP (ref 0–1)
BILIRUB SERPL-MCNC: 0.6 MG/DL — SIGNIFICANT CHANGE UP (ref 0.2–1.2)
BLD GP AB SCN SERPL QL: SIGNIFICANT CHANGE UP
BUN SERPL-MCNC: 23 MG/DL — HIGH (ref 10–20)
CALCIUM SERPL-MCNC: 8.7 MG/DL — SIGNIFICANT CHANGE UP (ref 8.5–10.1)
CHLORIDE SERPL-SCNC: 106 MMOL/L — SIGNIFICANT CHANGE UP (ref 98–110)
CO2 SERPL-SCNC: 23 MMOL/L — SIGNIFICANT CHANGE UP (ref 17–32)
CREAT SERPL-MCNC: 0.7 MG/DL — SIGNIFICANT CHANGE UP (ref 0.7–1.5)
EGFR: 79 ML/MIN/1.73M2 — SIGNIFICANT CHANGE UP
EOSINOPHIL # BLD AUTO: 0.14 K/UL — SIGNIFICANT CHANGE UP (ref 0–0.7)
EOSINOPHIL NFR BLD AUTO: 1.2 % — SIGNIFICANT CHANGE UP (ref 0–8)
GLUCOSE SERPL-MCNC: 104 MG/DL — HIGH (ref 70–99)
HCT VFR BLD CALC: 24.5 % — LOW (ref 37–47)
HGB BLD-MCNC: 8 G/DL — LOW (ref 12–16)
IMM GRANULOCYTES NFR BLD AUTO: 0.3 % — SIGNIFICANT CHANGE UP (ref 0.1–0.3)
LYMPHOCYTES # BLD AUTO: 2.52 K/UL — SIGNIFICANT CHANGE UP (ref 1.2–3.4)
LYMPHOCYTES # BLD AUTO: 21.9 % — SIGNIFICANT CHANGE UP (ref 20.5–51.1)
MAGNESIUM SERPL-MCNC: 1.9 MG/DL — SIGNIFICANT CHANGE UP (ref 1.8–2.4)
MCHC RBC-ENTMCNC: 32.7 G/DL — SIGNIFICANT CHANGE UP (ref 32–37)
MCHC RBC-ENTMCNC: 34.3 PG — HIGH (ref 27–31)
MCV RBC AUTO: 105.2 FL — HIGH (ref 81–99)
MONOCYTES # BLD AUTO: 1.31 K/UL — HIGH (ref 0.1–0.6)
MONOCYTES NFR BLD AUTO: 11.4 % — HIGH (ref 1.7–9.3)
NEUTROPHILS # BLD AUTO: 7.48 K/UL — HIGH (ref 1.4–6.5)
NEUTROPHILS NFR BLD AUTO: 64.9 % — SIGNIFICANT CHANGE UP (ref 42.2–75.2)
NRBC # BLD: 0 /100 WBCS — SIGNIFICANT CHANGE UP (ref 0–0)
PLATELET # BLD AUTO: 229 K/UL — SIGNIFICANT CHANGE UP (ref 130–400)
POTASSIUM SERPL-MCNC: 4.3 MMOL/L — SIGNIFICANT CHANGE UP (ref 3.5–5)
POTASSIUM SERPL-SCNC: 4.3 MMOL/L — SIGNIFICANT CHANGE UP (ref 3.5–5)
PROT SERPL-MCNC: 5.1 G/DL — LOW (ref 6–8)
RBC # BLD: 2.33 M/UL — LOW (ref 4.2–5.4)
RBC # FLD: 13 % — SIGNIFICANT CHANGE UP (ref 11.5–14.5)
SODIUM SERPL-SCNC: 139 MMOL/L — SIGNIFICANT CHANGE UP (ref 135–146)
WBC # BLD: 11.51 K/UL — HIGH (ref 4.8–10.8)
WBC # FLD AUTO: 11.51 K/UL — HIGH (ref 4.8–10.8)

## 2022-03-27 PROCEDURE — 99232 SBSQ HOSP IP/OBS MODERATE 35: CPT

## 2022-03-27 RX ADMIN — LACTULOSE 10 GRAM(S): 10 SOLUTION ORAL at 17:38

## 2022-03-27 RX ADMIN — Medication 650 MILLIGRAM(S): at 23:08

## 2022-03-27 RX ADMIN — MORPHINE SULFATE 2 MILLIGRAM(S): 50 CAPSULE, EXTENDED RELEASE ORAL at 21:18

## 2022-03-27 RX ADMIN — MORPHINE SULFATE 2 MILLIGRAM(S): 50 CAPSULE, EXTENDED RELEASE ORAL at 08:19

## 2022-03-27 RX ADMIN — SENNA PLUS 2 TABLET(S): 8.6 TABLET ORAL at 12:03

## 2022-03-27 RX ADMIN — Medication 3 MILLIGRAM(S): at 21:18

## 2022-03-27 RX ADMIN — Medication 650 MILLIGRAM(S): at 05:16

## 2022-03-27 RX ADMIN — Medication 650 MILLIGRAM(S): at 23:09

## 2022-03-27 RX ADMIN — Medication 650 MILLIGRAM(S): at 17:38

## 2022-03-27 RX ADMIN — MORPHINE SULFATE 2 MILLIGRAM(S): 50 CAPSULE, EXTENDED RELEASE ORAL at 20:12

## 2022-03-27 RX ADMIN — Medication 650 MILLIGRAM(S): at 05:10

## 2022-03-27 RX ADMIN — MORPHINE SULFATE 2 MILLIGRAM(S): 50 CAPSULE, EXTENDED RELEASE ORAL at 07:28

## 2022-03-27 RX ADMIN — MORPHINE SULFATE 2 MILLIGRAM(S): 50 CAPSULE, EXTENDED RELEASE ORAL at 08:20

## 2022-03-27 RX ADMIN — Medication 650 MILLIGRAM(S): at 12:09

## 2022-03-27 RX ADMIN — POLYETHYLENE GLYCOL 3350 17 GRAM(S): 17 POWDER, FOR SOLUTION ORAL at 12:09

## 2022-03-27 RX ADMIN — ENOXAPARIN SODIUM 40 MILLIGRAM(S): 100 INJECTION SUBCUTANEOUS at 05:17

## 2022-03-27 RX ADMIN — MORPHINE SULFATE 2 MILLIGRAM(S): 50 CAPSULE, EXTENDED RELEASE ORAL at 14:50

## 2022-03-27 RX ADMIN — VALSARTAN 40 MILLIGRAM(S): 80 TABLET ORAL at 05:10

## 2022-03-27 RX ADMIN — LACTULOSE 10 GRAM(S): 10 SOLUTION ORAL at 05:11

## 2022-03-27 NOTE — PROGRESS NOTE ADULT - SUBJECTIVE AND OBJECTIVE BOX
CESAR STUBBS  89y  Female      Patient is a 89y old  Female who presents with a chief complaint of Hip fracture (26 Mar 2022 10:12)      INTERVAL HPI/OVERNIGHT EVENTS: far less verbally interactive than prior baseline, daughter at bedside confirmed      REVIEW OF SYSTEMS:  limited as above  All other review of systems negative    T(C): 35.6 (03-27-22 @ 13:00), Max: 36.9 (03-26-22 @ 20:32)  HR: 87 (03-27-22 @ 13:00) (87 - 106)  BP: 102/51 (03-27-22 @ 13:00) (99/52 - 126/60)  RR: 18 (03-27-22 @ 13:00) (18 - 18)  SpO2: 91% (03-27-22 @ 13:00) (91% - 91%)  Wt(kg): --Vital Signs Last 24 Hrs  T(C): 35.6 (27 Mar 2022 13:00), Max: 36.9 (26 Mar 2022 20:32)  T(F): 96.1 (27 Mar 2022 13:00), Max: 98.5 (26 Mar 2022 20:32)  HR: 87 (27 Mar 2022 13:00) (87 - 106)  BP: 102/51 (27 Mar 2022 13:00) (99/52 - 126/60)  BP(mean): --  RR: 18 (27 Mar 2022 13:00) (18 - 18)  SpO2: 91% (27 Mar 2022 13:00) (91% - 91%)      03-27-22 @ 07:01  -  03-27-22 @ 17:14  --------------------------------------------------------  IN: 0 mL / OUT: 400 mL / NET: -400 mL        PHYSICAL EXAM:  GENERAL: deconditioned  PSYCH: more confused, hypoactive  NERVOUS SYSTEM: awakens briefly and answers some simple questions, no new focal deficits  PULMONARY: Clear to percussion bilaterally; No rales, rhonchi, wheezing, or rubs  CARDIOVASCULAR: Regular rate and rhythm; No murmurs, rubs, or gallops  GI: Soft, Nontender, Nondistended; Bowel sounds present  EXTREMITIES:  2+ Peripheral Pulses, No clubbing, cyanosis, or edema  MUSCULOSKELETAL: sx site, some  blister intact, slight L upper thigh bruising  SKIN a bit pale    Consultant(s) Notes Reviewed:  [x ] YES  [ ] NO    Discussed with Consultants/Other Providers [ x] YES     LABS                          8.0    11.51 )-----------( 229      ( 27 Mar 2022 07:05 )             24.5     03-27    139  |  106  |  23<H>  ----------------------------<  104<H>  4.3   |  23  |  0.7    Ca    8.7      27 Mar 2022 07:05  Mg     1.9     03-27    TPro  5.1<L>  /  Alb  3.0<L>  /  TBili  0.6  /  DBili  x   /  AST  15  /  ALT  11  /  AlkPhos  56  03-27          Lactate Trend  03-24 @ 23:33 Lactate:2.4         CAPILLARY BLOOD GLUCOSE            RADIOLOGY & ADDITIONAL TESTS:    Imaging Personally Reviewed:  [ ] YES  [ ] NO    HEALTH ISSUES - PROBLEM Dx:

## 2022-03-27 NOTE — PHYSICAL THERAPY INITIAL EVALUATION ADULT - GENERAL OBSERVATIONS, REHAB EVAL
10:30-11:09. Pt encountered semifowler in bed in NAD, +sharpe, + dressing  ACE bandage R lower leg and + dressing to R lateral thigh, + blisitering noted below dressing. Josiah MILES notified and called ortho x [phone] who were not available at this time. Josiah MILES notified to attempt ortho at later time to assess pt at b/s.

## 2022-03-27 NOTE — PHYSICAL THERAPY INITIAL EVALUATION ADULT - ADDITIONAL COMMENTS
Pt reports she has a shower chair at home, used RW to amb and was indep with self ADL's, lives with her daughter Charity. Pt reports she has a shower chair at home, used RW to amb and was indep with self ADL's, lives with her daughter Charity.  R hand dominant.

## 2022-03-27 NOTE — PROGRESS NOTE ADULT - SUBJECTIVE AND OBJECTIVE BOX
ORTHOPAEDIC SURGERY PROGRESS NOTE    Interval History:  POD2 s/p L hip ORIF  Dressings changed  Patient seen and examined at bedside.  Pain is controlled.  No complaints of chest pain, SOB, N/V.    Vital Signs Last 24 Hrs  T(C): 36.3 (27 Mar 2022 05:09), Max: 37.4 (26 Mar 2022 13:02)  T(F): 97.4 (27 Mar 2022 05:09), Max: 99.3 (26 Mar 2022 13:02)  HR: 87 (27 Mar 2022 05:09) (87 - 106)  BP: 99/52 (27 Mar 2022 05:09) (98/51 - 126/60)  BP(mean): --  RR: 18 (27 Mar 2022 05:09) (18 - 18)  SpO2: 92% (26 Mar 2022 13:02) (92% - 92%)    Physical Exam:   Dressings C/D/I, changed today  Compartments soft and compressible  Motor intact distally  SILT distally  CR<2sec, palpable pulses                        9.1    10.45 )-----------( 240      ( 26 Mar 2022 04:30 )             27.8     138  |  102  |  15  ----------------------------<  111<H>  4.8   |  23  |  0.8    Ca    8.7      26 Mar 2022 04:30  Mg     1.9     03-26  TPro  5.4<L>  /  Alb  3.0<L>  /  TBili  0.6  /  DBili  x   /  AST  22  /  ALT  25  /  AlkPhos  60  03-26    A/P: 89F s/p L hip ORIF on 3/25. Doing well.    - Dressings changed 3/27, wounds appear c/d/i  - Ancef completed  - PT/OT  - WBAT  - OOBTC  - F/U AM Labs  - DVT PPx  - Pain Control  - SCDs  - IS  - Dispo planning

## 2022-03-27 NOTE — PHYSICAL THERAPY INITIAL EVALUATION ADULT - PERTINENT HX OF CURRENT PROBLEM, REHAB EVAL
89 year old female with PMH of constipation, depression, GERD, gout, HTN, OA, and osteoporosis presented to the ED after fall. Patient is uncertain why she feel, she was ambulating with her walker and found herself on the floor. After the fall she complained of L hip pain and was unable to ambulate.

## 2022-03-27 NOTE — PHYSICAL THERAPY INITIAL EVALUATION ADULT - IMPAIRMENTS FOUND, PT EVAL
gait, locomotion, and balance/gross motor/integumentary integrity/joint integrity and mobility/muscle strength/ROM

## 2022-03-28 LAB
ALBUMIN SERPL ELPH-MCNC: 3.2 G/DL — LOW (ref 3.5–5.2)
ALP SERPL-CCNC: 63 U/L — SIGNIFICANT CHANGE UP (ref 30–115)
ALT FLD-CCNC: 8 U/L — SIGNIFICANT CHANGE UP (ref 0–41)
ANION GAP SERPL CALC-SCNC: 8 MMOL/L — SIGNIFICANT CHANGE UP (ref 7–14)
AST SERPL-CCNC: 15 U/L — SIGNIFICANT CHANGE UP (ref 0–41)
BASOPHILS # BLD AUTO: 0.04 K/UL — SIGNIFICANT CHANGE UP (ref 0–0.2)
BASOPHILS NFR BLD AUTO: 0.3 % — SIGNIFICANT CHANGE UP (ref 0–1)
BILIRUB SERPL-MCNC: 0.8 MG/DL — SIGNIFICANT CHANGE UP (ref 0.2–1.2)
BUN SERPL-MCNC: 16 MG/DL — SIGNIFICANT CHANGE UP (ref 10–20)
CALCIUM SERPL-MCNC: 9 MG/DL — SIGNIFICANT CHANGE UP (ref 8.5–10.1)
CHLORIDE SERPL-SCNC: 106 MMOL/L — SIGNIFICANT CHANGE UP (ref 98–110)
CO2 SERPL-SCNC: 25 MMOL/L — SIGNIFICANT CHANGE UP (ref 17–32)
CREAT SERPL-MCNC: 0.5 MG/DL — LOW (ref 0.7–1.5)
EGFR: 85 ML/MIN/1.73M2 — SIGNIFICANT CHANGE UP
EOSINOPHIL # BLD AUTO: 0.17 K/UL — SIGNIFICANT CHANGE UP (ref 0–0.7)
EOSINOPHIL NFR BLD AUTO: 1.1 % — SIGNIFICANT CHANGE UP (ref 0–8)
GLUCOSE SERPL-MCNC: 105 MG/DL — HIGH (ref 70–99)
HCT VFR BLD CALC: 23.8 % — LOW (ref 37–47)
HCT VFR BLD CALC: 26 % — LOW (ref 37–47)
HGB BLD-MCNC: 7.8 G/DL — LOW (ref 12–16)
HGB BLD-MCNC: 8.6 G/DL — LOW (ref 12–16)
IMM GRANULOCYTES NFR BLD AUTO: 0.4 % — HIGH (ref 0.1–0.3)
LYMPHOCYTES # BLD AUTO: 20.2 % — LOW (ref 20.5–51.1)
LYMPHOCYTES # BLD AUTO: 3.15 K/UL — SIGNIFICANT CHANGE UP (ref 1.2–3.4)
MAGNESIUM SERPL-MCNC: 1.9 MG/DL — SIGNIFICANT CHANGE UP (ref 1.8–2.4)
MCHC RBC-ENTMCNC: 32.8 G/DL — SIGNIFICANT CHANGE UP (ref 32–37)
MCHC RBC-ENTMCNC: 33.1 G/DL — SIGNIFICANT CHANGE UP (ref 32–37)
MCHC RBC-ENTMCNC: 34.2 PG — HIGH (ref 27–31)
MCHC RBC-ENTMCNC: 34.4 PG — HIGH (ref 27–31)
MCV RBC AUTO: 104 FL — HIGH (ref 81–99)
MCV RBC AUTO: 104.4 FL — HIGH (ref 81–99)
MONOCYTES # BLD AUTO: 1.45 K/UL — HIGH (ref 0.1–0.6)
MONOCYTES NFR BLD AUTO: 9.3 % — SIGNIFICANT CHANGE UP (ref 1.7–9.3)
NEUTROPHILS # BLD AUTO: 10.76 K/UL — HIGH (ref 1.4–6.5)
NEUTROPHILS NFR BLD AUTO: 68.7 % — SIGNIFICANT CHANGE UP (ref 42.2–75.2)
NRBC # BLD: 0 /100 WBCS — SIGNIFICANT CHANGE UP (ref 0–0)
NRBC # BLD: 0 /100 WBCS — SIGNIFICANT CHANGE UP (ref 0–0)
PLATELET # BLD AUTO: 228 K/UL — SIGNIFICANT CHANGE UP (ref 130–400)
PLATELET # BLD AUTO: 240 K/UL — SIGNIFICANT CHANGE UP (ref 130–400)
POTASSIUM SERPL-MCNC: 4.4 MMOL/L — SIGNIFICANT CHANGE UP (ref 3.5–5)
POTASSIUM SERPL-SCNC: 4.4 MMOL/L — SIGNIFICANT CHANGE UP (ref 3.5–5)
PROT SERPL-MCNC: 5.6 G/DL — LOW (ref 6–8)
RBC # BLD: 2.28 M/UL — LOW (ref 4.2–5.4)
RBC # BLD: 2.5 M/UL — LOW (ref 4.2–5.4)
RBC # FLD: 14.6 % — HIGH (ref 11.5–14.5)
RBC # FLD: 15.1 % — HIGH (ref 11.5–14.5)
SODIUM SERPL-SCNC: 139 MMOL/L — SIGNIFICANT CHANGE UP (ref 135–146)
WBC # BLD: 11.71 K/UL — HIGH (ref 4.8–10.8)
WBC # BLD: 15.63 K/UL — HIGH (ref 4.8–10.8)
WBC # FLD AUTO: 11.71 K/UL — HIGH (ref 4.8–10.8)
WBC # FLD AUTO: 15.63 K/UL — HIGH (ref 4.8–10.8)

## 2022-03-28 PROCEDURE — 71045 X-RAY EXAM CHEST 1 VIEW: CPT | Mod: 26

## 2022-03-28 PROCEDURE — 72192 CT PELVIS W/O DYE: CPT | Mod: 26

## 2022-03-28 PROCEDURE — 99232 SBSQ HOSP IP/OBS MODERATE 35: CPT

## 2022-03-28 PROCEDURE — 73700 CT LOWER EXTREMITY W/O DYE: CPT | Mod: 26,LT

## 2022-03-28 RX ORDER — CEFAZOLIN SODIUM 1 G
1000 VIAL (EA) INJECTION EVERY 8 HOURS
Refills: 0 | Status: DISCONTINUED | OUTPATIENT
Start: 2022-03-28 | End: 2022-03-29

## 2022-03-28 RX ORDER — LACTULOSE 10 G/15ML
10 SOLUTION ORAL EVERY 6 HOURS
Refills: 0 | Status: DISCONTINUED | OUTPATIENT
Start: 2022-03-28 | End: 2022-03-31

## 2022-03-28 RX ORDER — POLYETHYLENE GLYCOL 3350 17 G/17G
17 POWDER, FOR SOLUTION ORAL
Refills: 0 | Status: DISCONTINUED | OUTPATIENT
Start: 2022-03-28 | End: 2022-03-31

## 2022-03-28 RX ADMIN — LACTULOSE 10 GRAM(S): 10 SOLUTION ORAL at 18:41

## 2022-03-28 RX ADMIN — POLYETHYLENE GLYCOL 3350 17 GRAM(S): 17 POWDER, FOR SOLUTION ORAL at 11:18

## 2022-03-28 RX ADMIN — LACTULOSE 10 GRAM(S): 10 SOLUTION ORAL at 11:18

## 2022-03-28 RX ADMIN — VALSARTAN 40 MILLIGRAM(S): 80 TABLET ORAL at 05:12

## 2022-03-28 RX ADMIN — SENNA PLUS 2 TABLET(S): 8.6 TABLET ORAL at 08:26

## 2022-03-28 RX ADMIN — POLYETHYLENE GLYCOL 3350 17 GRAM(S): 17 POWDER, FOR SOLUTION ORAL at 18:39

## 2022-03-28 RX ADMIN — LACTULOSE 10 GRAM(S): 10 SOLUTION ORAL at 23:12

## 2022-03-28 RX ADMIN — Medication 650 MILLIGRAM(S): at 11:18

## 2022-03-28 RX ADMIN — Medication 100 MILLIGRAM(S): at 21:32

## 2022-03-28 RX ADMIN — MORPHINE SULFATE 2 MILLIGRAM(S): 50 CAPSULE, EXTENDED RELEASE ORAL at 00:14

## 2022-03-28 RX ADMIN — LACTULOSE 10 GRAM(S): 10 SOLUTION ORAL at 05:14

## 2022-03-28 RX ADMIN — Medication 650 MILLIGRAM(S): at 18:42

## 2022-03-28 RX ADMIN — Medication 650 MILLIGRAM(S): at 05:12

## 2022-03-28 RX ADMIN — ENOXAPARIN SODIUM 40 MILLIGRAM(S): 100 INJECTION SUBCUTANEOUS at 05:14

## 2022-03-28 RX ADMIN — Medication 650 MILLIGRAM(S): at 23:17

## 2022-03-28 RX ADMIN — Medication 650 MILLIGRAM(S): at 23:13

## 2022-03-28 RX ADMIN — MORPHINE SULFATE 2 MILLIGRAM(S): 50 CAPSULE, EXTENDED RELEASE ORAL at 15:14

## 2022-03-28 RX ADMIN — Medication 650 MILLIGRAM(S): at 05:13

## 2022-03-28 NOTE — PROGRESS NOTE ADULT - SUBJECTIVE AND OBJECTIVE BOX
Orthopaedic Surgery Progress Note    S&E at bedside this AM, POD #3 from left hip ORIF. Pain well controlled. Denies fever/chills/CP/SOB. No other complaints      T(C): 37.4 (03-28-22 @ 05:04), Max: 37.4 (03-28-22 @ 05:04)  HR: 86 (03-28-22 @ 05:04) (81 - 87)  BP: 136/64 (03-28-22 @ 05:04) (102/51 - 136/64)  RR: 18 (03-28-22 @ 05:04) (18 - 18)  SpO2: 96% (03-28-22 @ 05:04) (91% - 96%)    P/E:  NAD  Nonlabored breathing      LLE  Dressing/splint in place, distal dressings c/d/i, proximal dressing saturated from fracture blisters; dressing to be changed today  SILT sp/dp/t/sural/saph  Firing ta/ehl/fhl/gs  Foot WWP, 2+ DP pulse    Labs                        8.6    15.63 )-----------( 240      ( 28 Mar 2022 05:30 )             26.0     03-28    139  |  106  |  16  ----------------------------<  105<H>  4.4   |  25  |  0.5<L>    Ca    9.0      28 Mar 2022 05:30  Mg     1.9     03-28    TPro  5.6<L>  /  Alb  3.2<L>  /  TBili  0.8  /  DBili  x   /  AST  15  /  ALT  8   /  AlkPhos  63  03-28    LIVER FUNCTIONS - ( 28 Mar 2022 05:30 )  Alb: 3.2 g/dL / Pro: 5.6 g/dL / ALK PHOS: 63 U/L / ALT: 8 U/L / AST: 15 U/L / GGT: x             A/P:   89yo Female s/p left hip ORIF POD#3    Weightbearing: WBAT  DVT ppx: SCD, chem  Abx: post op abx completed  Pain control  Home meds  Trend labs  PT/OT/Rehab  Dispo: Pending PT recommendations, wound drainage to subside            Orthopaedic Surgery Progress Note    S&E at bedside this AM, POD #3 from left hip ORIF. Pain well controlled. Denies fever/chills/CP/SOB. No other complaints      T(C): 37.4 (03-28-22 @ 05:04), Max: 37.4 (03-28-22 @ 05:04)  HR: 86 (03-28-22 @ 05:04) (81 - 87)  BP: 136/64 (03-28-22 @ 05:04) (102/51 - 136/64)  RR: 18 (03-28-22 @ 05:04) (18 - 18)  SpO2: 96% (03-28-22 @ 05:04) (91% - 96%)    P/E:  NAD  Nonlabored breathing      LLE  Dressing/splint in place, distal dressings c/d/i, proximal dressing saturated from fracture blisters; dressing to be changed today  SILT sp/dp/t/sural/saph  Firing ta/ehl/fhl/gs  Foot WWP, 2+ DP pulse    Distal LLE with abrasion obtained intraop from traction table boot; dressings removed although c/d/i, nylon sutures in place, healing well, re-dressed with adaptic gauze and ACE    Labs                        8.6    15.63 )-----------( 240      ( 28 Mar 2022 05:30 )             26.0     03-28    139  |  106  |  16  ----------------------------<  105<H>  4.4   |  25  |  0.5<L>    Ca    9.0      28 Mar 2022 05:30  Mg     1.9     03-28    TPro  5.6<L>  /  Alb  3.2<L>  /  TBili  0.8  /  DBili  x   /  AST  15  /  ALT  8   /  AlkPhos  63  03-28    LIVER FUNCTIONS - ( 28 Mar 2022 05:30 )  Alb: 3.2 g/dL / Pro: 5.6 g/dL / ALK PHOS: 63 U/L / ALT: 8 U/L / AST: 15 U/L / GGT: x             A/P:   89yo Female s/p left hip ORIF POD#3    Weightbearing: WBAT  DVT ppx: SCD, chem  Abx: post op abx completed  Pain control  Home meds  Trend labs  PT/OT/Rehab  Dispo: Pending PT recommendations, wound drainage to subside

## 2022-03-29 LAB
ALBUMIN SERPL ELPH-MCNC: 2.9 G/DL — LOW (ref 3.5–5.2)
ALP SERPL-CCNC: 56 U/L — SIGNIFICANT CHANGE UP (ref 30–115)
ALT FLD-CCNC: 7 U/L — SIGNIFICANT CHANGE UP (ref 0–41)
ANION GAP SERPL CALC-SCNC: 11 MMOL/L — SIGNIFICANT CHANGE UP (ref 7–14)
APPEARANCE UR: CLEAR — SIGNIFICANT CHANGE UP
AST SERPL-CCNC: 13 U/L — SIGNIFICANT CHANGE UP (ref 0–41)
BACTERIA # UR AUTO: NEGATIVE — SIGNIFICANT CHANGE UP
BASOPHILS # BLD AUTO: 0.03 K/UL — SIGNIFICANT CHANGE UP (ref 0–0.2)
BASOPHILS NFR BLD AUTO: 0.3 % — SIGNIFICANT CHANGE UP (ref 0–1)
BILIRUB SERPL-MCNC: 0.7 MG/DL — SIGNIFICANT CHANGE UP (ref 0.2–1.2)
BILIRUB UR-MCNC: NEGATIVE — SIGNIFICANT CHANGE UP
BLD GP AB SCN SERPL QL: SIGNIFICANT CHANGE UP
BUN SERPL-MCNC: 12 MG/DL — SIGNIFICANT CHANGE UP (ref 10–20)
CALCIUM SERPL-MCNC: 8.6 MG/DL — SIGNIFICANT CHANGE UP (ref 8.5–10.1)
CHLORIDE SERPL-SCNC: 108 MMOL/L — SIGNIFICANT CHANGE UP (ref 98–110)
CO2 SERPL-SCNC: 25 MMOL/L — SIGNIFICANT CHANGE UP (ref 17–32)
COLOR SPEC: YELLOW — SIGNIFICANT CHANGE UP
CREAT SERPL-MCNC: 0.5 MG/DL — LOW (ref 0.7–1.5)
DIFF PNL FLD: NEGATIVE — SIGNIFICANT CHANGE UP
EGFR: 85 ML/MIN/1.73M2 — SIGNIFICANT CHANGE UP
EOSINOPHIL # BLD AUTO: 0.22 K/UL — SIGNIFICANT CHANGE UP (ref 0–0.7)
EOSINOPHIL NFR BLD AUTO: 2.1 % — SIGNIFICANT CHANGE UP (ref 0–8)
EPI CELLS # UR: 1 /HPF — SIGNIFICANT CHANGE UP (ref 0–5)
GLUCOSE SERPL-MCNC: 102 MG/DL — HIGH (ref 70–99)
GLUCOSE UR QL: NEGATIVE — SIGNIFICANT CHANGE UP
HCT VFR BLD CALC: 24.8 % — LOW (ref 37–47)
HCT VFR BLD CALC: 25.1 % — LOW (ref 37–47)
HGB BLD-MCNC: 8 G/DL — LOW (ref 12–16)
HGB BLD-MCNC: 8.1 G/DL — LOW (ref 12–16)
HYALINE CASTS # UR AUTO: 3 /LPF — SIGNIFICANT CHANGE UP (ref 0–7)
IMM GRANULOCYTES NFR BLD AUTO: 0.4 % — HIGH (ref 0.1–0.3)
KETONES UR-MCNC: SIGNIFICANT CHANGE UP
LEUKOCYTE ESTERASE UR-ACNC: NEGATIVE — SIGNIFICANT CHANGE UP
LYMPHOCYTES # BLD AUTO: 1.87 K/UL — SIGNIFICANT CHANGE UP (ref 1.2–3.4)
LYMPHOCYTES # BLD AUTO: 18.1 % — LOW (ref 20.5–51.1)
MAGNESIUM SERPL-MCNC: 1.7 MG/DL — LOW (ref 1.8–2.4)
MCHC RBC-ENTMCNC: 31.9 G/DL — LOW (ref 32–37)
MCHC RBC-ENTMCNC: 32.7 G/DL — SIGNIFICANT CHANGE UP (ref 32–37)
MCHC RBC-ENTMCNC: 34 PG — HIGH (ref 27–31)
MCHC RBC-ENTMCNC: 34.3 PG — HIGH (ref 27–31)
MCV RBC AUTO: 104.2 FL — HIGH (ref 81–99)
MCV RBC AUTO: 107.7 FL — HIGH (ref 81–99)
MONOCYTES # BLD AUTO: 0.83 K/UL — HIGH (ref 0.1–0.6)
MONOCYTES NFR BLD AUTO: 8 % — SIGNIFICANT CHANGE UP (ref 1.7–9.3)
NEUTROPHILS # BLD AUTO: 7.33 K/UL — HIGH (ref 1.4–6.5)
NEUTROPHILS NFR BLD AUTO: 71.1 % — SIGNIFICANT CHANGE UP (ref 42.2–75.2)
NITRITE UR-MCNC: NEGATIVE — SIGNIFICANT CHANGE UP
NRBC # BLD: 0 /100 WBCS — SIGNIFICANT CHANGE UP (ref 0–0)
NRBC # BLD: 0 /100 WBCS — SIGNIFICANT CHANGE UP (ref 0–0)
PH UR: 5.5 — SIGNIFICANT CHANGE UP (ref 5–8)
PLATELET # BLD AUTO: 250 K/UL — SIGNIFICANT CHANGE UP (ref 130–400)
PLATELET # BLD AUTO: 280 K/UL — SIGNIFICANT CHANGE UP (ref 130–400)
POTASSIUM SERPL-MCNC: 3.9 MMOL/L — SIGNIFICANT CHANGE UP (ref 3.5–5)
POTASSIUM SERPL-SCNC: 3.9 MMOL/L — SIGNIFICANT CHANGE UP (ref 3.5–5)
PROCALCITONIN SERPL-MCNC: 0.05 NG/ML — SIGNIFICANT CHANGE UP (ref 0.02–0.1)
PROCALCITONIN SERPL-MCNC: 0.07 NG/ML — SIGNIFICANT CHANGE UP (ref 0.02–0.1)
PROT SERPL-MCNC: 5.3 G/DL — LOW (ref 6–8)
PROT UR-MCNC: ABNORMAL
RBC # BLD: 2.33 M/UL — LOW (ref 4.2–5.4)
RBC # BLD: 2.38 M/UL — LOW (ref 4.2–5.4)
RBC # FLD: 14.5 % — SIGNIFICANT CHANGE UP (ref 11.5–14.5)
RBC # FLD: 14.6 % — HIGH (ref 11.5–14.5)
RBC CASTS # UR COMP ASSIST: 5 /HPF — HIGH (ref 0–4)
SODIUM SERPL-SCNC: 144 MMOL/L — SIGNIFICANT CHANGE UP (ref 135–146)
SP GR SPEC: 1.03 — HIGH (ref 1.01–1.03)
UROBILINOGEN FLD QL: SIGNIFICANT CHANGE UP
WBC # BLD: 10.32 K/UL — SIGNIFICANT CHANGE UP (ref 4.8–10.8)
WBC # BLD: 11.71 K/UL — HIGH (ref 4.8–10.8)
WBC # FLD AUTO: 10.32 K/UL — SIGNIFICANT CHANGE UP (ref 4.8–10.8)
WBC # FLD AUTO: 11.71 K/UL — HIGH (ref 4.8–10.8)
WBC UR QL: 5 /HPF — SIGNIFICANT CHANGE UP (ref 0–5)

## 2022-03-29 PROCEDURE — 99232 SBSQ HOSP IP/OBS MODERATE 35: CPT

## 2022-03-29 RX ORDER — MORPHINE SULFATE 50 MG/1
4 CAPSULE, EXTENDED RELEASE ORAL EVERY 6 HOURS
Refills: 0 | Status: DISCONTINUED | OUTPATIENT
Start: 2022-03-29 | End: 2022-03-31

## 2022-03-29 RX ORDER — MAGNESIUM OXIDE 400 MG ORAL TABLET 241.3 MG
400 TABLET ORAL
Refills: 0 | Status: COMPLETED | OUTPATIENT
Start: 2022-03-29 | End: 2022-03-30

## 2022-03-29 RX ORDER — TRAMADOL HYDROCHLORIDE 50 MG/1
50 TABLET ORAL EVERY 8 HOURS
Refills: 0 | Status: DISCONTINUED | OUTPATIENT
Start: 2022-03-29 | End: 2022-03-31

## 2022-03-29 RX ADMIN — Medication 100 MILLIGRAM(S): at 13:55

## 2022-03-29 RX ADMIN — ENOXAPARIN SODIUM 40 MILLIGRAM(S): 100 INJECTION SUBCUTANEOUS at 05:07

## 2022-03-29 RX ADMIN — Medication 650 MILLIGRAM(S): at 23:00

## 2022-03-29 RX ADMIN — SENNA PLUS 2 TABLET(S): 8.6 TABLET ORAL at 09:52

## 2022-03-29 RX ADMIN — MAGNESIUM OXIDE 400 MG ORAL TABLET 400 MILLIGRAM(S): 241.3 TABLET ORAL at 09:51

## 2022-03-29 RX ADMIN — Medication 100 MILLIGRAM(S): at 05:06

## 2022-03-29 RX ADMIN — LACTULOSE 10 GRAM(S): 10 SOLUTION ORAL at 05:07

## 2022-03-29 RX ADMIN — Medication 650 MILLIGRAM(S): at 05:22

## 2022-03-29 RX ADMIN — MORPHINE SULFATE 2 MILLIGRAM(S): 50 CAPSULE, EXTENDED RELEASE ORAL at 09:52

## 2022-03-29 RX ADMIN — Medication 10 MILLIGRAM(S): at 09:51

## 2022-03-29 RX ADMIN — Medication 650 MILLIGRAM(S): at 18:54

## 2022-03-29 RX ADMIN — Medication 650 MILLIGRAM(S): at 11:19

## 2022-03-29 RX ADMIN — Medication 650 MILLIGRAM(S): at 05:07

## 2022-03-29 RX ADMIN — LACTULOSE 10 GRAM(S): 10 SOLUTION ORAL at 23:01

## 2022-03-29 RX ADMIN — POLYETHYLENE GLYCOL 3350 17 GRAM(S): 17 POWDER, FOR SOLUTION ORAL at 18:57

## 2022-03-29 RX ADMIN — LACTULOSE 10 GRAM(S): 10 SOLUTION ORAL at 18:54

## 2022-03-29 RX ADMIN — POLYETHYLENE GLYCOL 3350 17 GRAM(S): 17 POWDER, FOR SOLUTION ORAL at 05:06

## 2022-03-29 RX ADMIN — MAGNESIUM OXIDE 400 MG ORAL TABLET 400 MILLIGRAM(S): 241.3 TABLET ORAL at 18:53

## 2022-03-29 RX ADMIN — MAGNESIUM OXIDE 400 MG ORAL TABLET 400 MILLIGRAM(S): 241.3 TABLET ORAL at 13:48

## 2022-03-29 RX ADMIN — VALSARTAN 40 MILLIGRAM(S): 80 TABLET ORAL at 05:07

## 2022-03-29 RX ADMIN — LACTULOSE 10 GRAM(S): 10 SOLUTION ORAL at 11:19

## 2022-03-29 NOTE — PROGRESS NOTE ADULT - SUBJECTIVE AND OBJECTIVE BOX
CESAR STUBBS MRN#: [id number]  CODE STATUS: FULL CODE     SUBJECTIVE   Chief complaint: fall    Currently admitted to medicine with the primary diagnosis of HIP FRACTURE      Today is hospital day 4d,   INTERVAL HPI/OVERNIGHT EVENTS: small BM yesterday PM.    This morning, she is laying in bed. She c/o constipation this morning. Pt unsure if she is having L hip pain or not.     Present Today:           [place] Catheter ()No/ (x)Yes?   Indication: retention          Central Line (x)No/ ()Yes?  Indication:          IV Fluids (x)No/ ()Yes?  Indication:     OBJECTIVE  PAST MEDICAL & SURGICAL HISTORY  Constipation    GERD (gastroesophageal reflux disease)    Osteoporosis    OA (osteoarthritis)      ALLERGIES:  No Known Allergies    HOME MEDICATIONS:  Home Medications:  acetaminophen 500 mg oral tablet, chewable: 2 tab(s) orally every 12 hours. MDD 3g daily (25 Mar 2022 05:21)  cyanocobalamin 1000 mcg/mL injectable solution: 1 milliliter(s) injectable every 30 days (25 Mar 2022 05:21)  docusate potassium 100 mg oral capsule: 3 cap(s) orally once a day (at bedtime) (25 Mar 2022 05:21)  Aimee-Lanta oral suspension: 10 milliliter(s) orally 4 times a day (after meals and at bedtime), As Needed (25 Mar 2022 05:21)  lactulose 10 g/15 mL oral syrup: 10 gram(s) orally 2 times a day (25 Mar 2022 05:21)  Senna 8.6 mg oral tablet: 2 tab(s) orally once a day at 9 AM (25 Mar 2022 05:21)  valsartan 80 mg oral tablet: 1 tab(s) orally once a day (25 Mar 2022 05:21)    MEDICATIONS:  STANDING MEDICATIONS  MEDICATIONS  (STANDING):  acetaminophen     Tablet .. 650 milliGRAM(s) Oral every 6 hours  enoxaparin Injectable 40 milliGRAM(s) SubCutaneous every 24 hours  lactulose Syrup 10 Gram(s) Oral every 6 hours  magnesium oxide 400 milliGRAM(s) Oral three times a day with meals  polyethylene glycol 3350 17 Gram(s) Oral two times a day  senna 2 Tablet(s) Oral <User Schedule>  valsartan 40 milliGRAM(s) Oral daily    PRN MEDICATIONS  MEDICATIONS  (PRN):  aluminum hydroxide/magnesium hydroxide/simethicone Suspension 30 milliLiter(s) Oral every 6 hours PRN Dyspepsia  melatonin 3 milliGRAM(s) Oral at bedtime PRN Insomnia  morphine  - Injectable 4 milliGRAM(s) IV Push every 6 hours PRN Severe Pain (7 - 10)      VITAL SIGNS  T(F): 97.6 ( @ 13:23), Max: 98.5 ( @ 20:41)  HR: 78 ( @ 13:23) (65 - 84)  BP: 133/62 ( @ 13:23) (133/62 - 147/67)  RR: 18 ( @ 13:23) (18 - 18)  SpO2: 98% ( @ 13:23) (98% - 98%)    LABS:                        8.1    10.32 )-----------( 250      ( 29 Mar 2022 04:30 )             24.8         144  |  108  |  12  ----------------------------<  102<H>  3.9   |  25  |  0.5<L>    Ca    8.6      29 Mar 2022 04:30  Mg     1.7         TPro  5.3<L>  /  Alb  2.9<L>  /  TBili  0.7  /  DBili  x   /  AST  13  /  ALT  7   /  AlkPhos  56        Urinalysis Basic - ( 29 Mar 2022 00:55 )    Color: Yellow / Appearance: Clear / S.031 / pH: x  Gluc: x / Ketone: Trace  / Bili: Negative / Urobili: <2 mg/dL   Blood: x / Protein: 30 mg/dL / Nitrite: Negative   Leuk Esterase: Negative / RBC: 5 /HPF / WBC 5 /HPF   Sq Epi: x / Non Sq Epi: 1 /HPF / Bacteria: Negative      RADIOLOGY:   Reviewed  CT hip L - subq hematoma 3.6x2.2cm extending at least 3cm (superior portion not visualized)  CT pelvis - subq hematoma 4.7x1.7x10.9cm with reactive edema.      PHYSICAL EXAM:  General: Laying on the hospital bed. NAD.  HEENT: Atraumatic. Normocephalic. EOMI. Conjunctiva and sclera clear.  Cardiac: Normal S1, S2. RRR. No murmurs, rubs, or gallops.  Pulm: CTA b/l no wheezes, rhonchi, or rales.  GI: Soft, nontender, nondistended.   Ext: 2+ pulses. Moving all 4 extremities. No edema, cyanosis.  Neuro: CN II-XII grossly intact  Skin: L hip dressing with scant serous drainage visible through gauze

## 2022-03-29 NOTE — PROGRESS NOTE ADULT - SUBJECTIVE AND OBJECTIVE BOX
ORTHOPAEDIC SURGERY PROGRESS NOTE    Interval History:  Patient seen and examined at bedside.  Pain is controlled.  No complaints of chest pain, SOB, N/V.    MEDICATIONS  (STANDING):  acetaminophen     Tablet .. 650 milliGRAM(s) Oral every 6 hours  ceFAZolin   IVPB 1000 milliGRAM(s) IV Intermittent every 8 hours  enoxaparin Injectable 40 milliGRAM(s) SubCutaneous every 24 hours  lactulose Syrup 10 Gram(s) Oral every 6 hours  magnesium oxide 400 milliGRAM(s) Oral three times a day with meals  polyethylene glycol 3350 17 Gram(s) Oral two times a day  senna 2 Tablet(s) Oral <User Schedule>  valsartan 40 milliGRAM(s) Oral daily    MEDICATIONS  (PRN):  aluminum hydroxide/magnesium hydroxide/simethicone Suspension 30 milliLiter(s) Oral every 6 hours PRN Dyspepsia  melatonin 3 milliGRAM(s) Oral at bedtime PRN Insomnia  morphine  - Injectable 2 milliGRAM(s) IV Push every 6 hours PRN Severe Pain (7 - 10)    Vital Signs Last 24 Hrs  T(C): 36.4 (29 Mar 2022 13:23), Max: 36.9 (28 Mar 2022 20:41)  T(F): 97.6 (29 Mar 2022 13:23), Max: 98.5 (28 Mar 2022 20:41)  HR: 78 (29 Mar 2022 13:23) (65 - 84)  BP: 133/62 (29 Mar 2022 13:23) (125/68 - 147/67)  RR: 18 (29 Mar 2022 13:23) (18 - 18)  SpO2: 98% (29 Mar 2022 13:23) (98% - 98%)    Physical Exam:   Dressings C/D/I  Distal ace bandage dressing c/d/i  Compartments soft and compressible  Motor intact distally  SILT distally  CR<2sec, palpable pulses                        8.1    10.32 )-----------( 250      ( 29 Mar 2022 04:30 )             24.8    144  |  108  |  12  ----------------------------<  102<H>  3.9   |  25  |  0.5<L>    Ca    8.6      29 Mar 2022 04:30  Mg     1.7     03-29  TPro  5.3<L>  /  Alb  2.9<L>  /  TBili  0.7  /  DBili  x   /  AST  13  /  ALT  7   /  AlkPhos  56      Urinalysis Basic - ( 29 Mar 2022 00:55 )  Color: Yellow / Appearance: Clear / S.031 / pH: x  Gluc: x / Ketone: Trace  / Bili: Negative / Urobili: <2 mg/dL   Blood: x / Protein: 30 mg/dL / Nitrite: Negative   Leuk Esterase: Negative / RBC: 5 /HPF / WBC 5 /HPF   Sq Epi: x / Non Sq Epi: 1 /HPF / Bacteria: Negative    A/P: 89F s/p left hip ORIF on 3/25/22, now POD#4.    - UA negative for infxn  - Abx: Ancef  - Weightbearing: WBAT  - DVT ppx: SCD, chem  - Pain control  - Home meds  - Trend labs  - PT/OT/Rehab  - Dispo: Pending PT recommendations, wound drainage to subside

## 2022-03-30 LAB
ALBUMIN SERPL ELPH-MCNC: 3 G/DL — LOW (ref 3.5–5.2)
ALP SERPL-CCNC: 62 U/L — SIGNIFICANT CHANGE UP (ref 30–115)
ALT FLD-CCNC: 8 U/L — SIGNIFICANT CHANGE UP (ref 0–41)
ANION GAP SERPL CALC-SCNC: 9 MMOL/L — SIGNIFICANT CHANGE UP (ref 7–14)
AST SERPL-CCNC: 15 U/L — SIGNIFICANT CHANGE UP (ref 0–41)
BASOPHILS # BLD AUTO: 0.03 K/UL — SIGNIFICANT CHANGE UP (ref 0–0.2)
BASOPHILS NFR BLD AUTO: 0.3 % — SIGNIFICANT CHANGE UP (ref 0–1)
BILIRUB SERPL-MCNC: 1.1 MG/DL — SIGNIFICANT CHANGE UP (ref 0.2–1.2)
BUN SERPL-MCNC: 12 MG/DL — SIGNIFICANT CHANGE UP (ref 10–20)
CALCIUM SERPL-MCNC: 8.9 MG/DL — SIGNIFICANT CHANGE UP (ref 8.5–10.1)
CHLORIDE SERPL-SCNC: 106 MMOL/L — SIGNIFICANT CHANGE UP (ref 98–110)
CO2 SERPL-SCNC: 27 MMOL/L — SIGNIFICANT CHANGE UP (ref 17–32)
CREAT SERPL-MCNC: 0.5 MG/DL — LOW (ref 0.7–1.5)
EGFR: 85 ML/MIN/1.73M2 — SIGNIFICANT CHANGE UP
EOSINOPHIL # BLD AUTO: 0.21 K/UL — SIGNIFICANT CHANGE UP (ref 0–0.7)
EOSINOPHIL NFR BLD AUTO: 2 % — SIGNIFICANT CHANGE UP (ref 0–8)
GLUCOSE SERPL-MCNC: 102 MG/DL — HIGH (ref 70–99)
HCT VFR BLD CALC: 23.2 % — LOW (ref 37–47)
HGB BLD-MCNC: 7.9 G/DL — LOW (ref 12–16)
IMM GRANULOCYTES NFR BLD AUTO: 0.5 % — HIGH (ref 0.1–0.3)
LYMPHOCYTES # BLD AUTO: 2.28 K/UL — SIGNIFICANT CHANGE UP (ref 1.2–3.4)
LYMPHOCYTES # BLD AUTO: 21.4 % — SIGNIFICANT CHANGE UP (ref 20.5–51.1)
MAGNESIUM SERPL-MCNC: 2 MG/DL — SIGNIFICANT CHANGE UP (ref 1.8–2.4)
MCHC RBC-ENTMCNC: 34.1 G/DL — SIGNIFICANT CHANGE UP (ref 32–37)
MCHC RBC-ENTMCNC: 35.1 PG — HIGH (ref 27–31)
MCV RBC AUTO: 103.1 FL — HIGH (ref 81–99)
MONOCYTES # BLD AUTO: 0.93 K/UL — HIGH (ref 0.1–0.6)
MONOCYTES NFR BLD AUTO: 8.7 % — SIGNIFICANT CHANGE UP (ref 1.7–9.3)
NEUTROPHILS # BLD AUTO: 7.16 K/UL — HIGH (ref 1.4–6.5)
NEUTROPHILS NFR BLD AUTO: 67.1 % — SIGNIFICANT CHANGE UP (ref 42.2–75.2)
NRBC # BLD: 0 /100 WBCS — SIGNIFICANT CHANGE UP (ref 0–0)
PLATELET # BLD AUTO: 274 K/UL — SIGNIFICANT CHANGE UP (ref 130–400)
POTASSIUM SERPL-MCNC: 4.3 MMOL/L — SIGNIFICANT CHANGE UP (ref 3.5–5)
POTASSIUM SERPL-SCNC: 4.3 MMOL/L — SIGNIFICANT CHANGE UP (ref 3.5–5)
PROT SERPL-MCNC: 5.2 G/DL — LOW (ref 6–8)
RBC # BLD: 2.25 M/UL — LOW (ref 4.2–5.4)
RBC # BLD: 2.25 M/UL — LOW (ref 4.2–5.4)
RBC # FLD: 14.8 % — HIGH (ref 11.5–14.5)
RETICS #: 135 K/UL — HIGH (ref 25–125)
RETICS/RBC NFR: 6 % — HIGH (ref 0.5–1.5)
SODIUM SERPL-SCNC: 142 MMOL/L — SIGNIFICANT CHANGE UP (ref 135–146)
WBC # BLD: 10.66 K/UL — SIGNIFICANT CHANGE UP (ref 4.8–10.8)
WBC # FLD AUTO: 10.66 K/UL — SIGNIFICANT CHANGE UP (ref 4.8–10.8)

## 2022-03-30 PROCEDURE — 99233 SBSQ HOSP IP/OBS HIGH 50: CPT

## 2022-03-30 RX ADMIN — Medication 650 MILLIGRAM(S): at 23:20

## 2022-03-30 RX ADMIN — MAGNESIUM OXIDE 400 MG ORAL TABLET 400 MILLIGRAM(S): 241.3 TABLET ORAL at 09:17

## 2022-03-30 RX ADMIN — SENNA PLUS 2 TABLET(S): 8.6 TABLET ORAL at 09:18

## 2022-03-30 RX ADMIN — POLYETHYLENE GLYCOL 3350 17 GRAM(S): 17 POWDER, FOR SOLUTION ORAL at 18:27

## 2022-03-30 RX ADMIN — Medication 650 MILLIGRAM(S): at 18:27

## 2022-03-30 RX ADMIN — POLYETHYLENE GLYCOL 3350 17 GRAM(S): 17 POWDER, FOR SOLUTION ORAL at 05:48

## 2022-03-30 RX ADMIN — LACTULOSE 10 GRAM(S): 10 SOLUTION ORAL at 05:48

## 2022-03-30 RX ADMIN — ENOXAPARIN SODIUM 40 MILLIGRAM(S): 100 INJECTION SUBCUTANEOUS at 05:49

## 2022-03-30 RX ADMIN — Medication 650 MILLIGRAM(S): at 02:15

## 2022-03-30 RX ADMIN — Medication 650 MILLIGRAM(S): at 05:48

## 2022-03-30 RX ADMIN — LACTULOSE 10 GRAM(S): 10 SOLUTION ORAL at 23:21

## 2022-03-30 RX ADMIN — TRAMADOL HYDROCHLORIDE 50 MILLIGRAM(S): 50 TABLET ORAL at 12:00

## 2022-03-30 RX ADMIN — LACTULOSE 10 GRAM(S): 10 SOLUTION ORAL at 18:30

## 2022-03-30 RX ADMIN — LACTULOSE 10 GRAM(S): 10 SOLUTION ORAL at 11:57

## 2022-03-30 RX ADMIN — Medication 650 MILLIGRAM(S): at 23:50

## 2022-03-30 RX ADMIN — VALSARTAN 40 MILLIGRAM(S): 80 TABLET ORAL at 05:48

## 2022-03-30 NOTE — PROGRESS NOTE ADULT - SUBJECTIVE AND OBJECTIVE BOX
CESAR STUBBS MRN#: [id number]  CODE STATUS: FULL CODE     SUBJECTIVE   Chief complaint: fall    Currently admitted to medicine with the primary diagnosis of HIP FRACTURE      Today is hospital day 5d,   INTERVAL HPI/OVERNIGHT EVENTS: No acute events overnight    This morning, she is laying in bed. Denies chest pain, shortness of breath, nausea, vomiting. She's c/o constipation today. No urine output since discontinuation of sharpe yesterday.        Present Today:           Sharpe Catheter (x)No/ ()Yes?   Indication:             Central Line (x)No/ ()Yes?  Indication:          IV Fluids (x)No/ ()Yes?  Indication:     OBJECTIVE  PAST MEDICAL & SURGICAL HISTORY  Constipation    GERD (gastroesophageal reflux disease)    Osteoporosis    OA (osteoarthritis)      ALLERGIES:  No Known Allergies    HOME MEDICATIONS:  Home Medications:  acetaminophen 500 mg oral tablet, chewable: 2 tab(s) orally every 12 hours. MDD 3g daily (25 Mar 2022 05:21)  cyanocobalamin 1000 mcg/mL injectable solution: 1 milliliter(s) injectable every 30 days (25 Mar 2022 05:21)  docusate potassium 100 mg oral capsule: 3 cap(s) orally once a day (at bedtime) (25 Mar 2022 05:21)  Aimee-Lanta oral suspension: 10 milliliter(s) orally 4 times a day (after meals and at bedtime), As Needed (25 Mar 2022 05:21)  lactulose 10 g/15 mL oral syrup: 10 gram(s) orally 2 times a day (25 Mar 2022 05:21)  Senna 8.6 mg oral tablet: 2 tab(s) orally once a day at 9 AM (25 Mar 2022 05:21)  valsartan 80 mg oral tablet: 1 tab(s) orally once a day (25 Mar 2022 05:21)    MEDICATIONS:  STANDING MEDICATIONS  MEDICATIONS  (STANDING):  acetaminophen     Tablet .. 650 milliGRAM(s) Oral every 6 hours  enoxaparin Injectable 40 milliGRAM(s) SubCutaneous every 24 hours  lactulose Syrup 10 Gram(s) Oral every 6 hours  polyethylene glycol 3350 17 Gram(s) Oral two times a day  senna 2 Tablet(s) Oral <User Schedule>  valsartan 40 milliGRAM(s) Oral daily    PRN MEDICATIONS  MEDICATIONS  (PRN):  aluminum hydroxide/magnesium hydroxide/simethicone Suspension 30 milliLiter(s) Oral every 6 hours PRN Dyspepsia  melatonin 3 milliGRAM(s) Oral at bedtime PRN Insomnia  morphine  - Injectable 4 milliGRAM(s) IV Push every 6 hours PRN Severe Pain (7 - 10)  traMADol 50 milliGRAM(s) Oral every 8 hours PRN Moderate Pain (4 - 6)      VITAL SIGNS  T(F): 98 ( @ 05:00), Max: 98.5 ( @ 20:35)  HR: 75 ( @ 05:00) (75 - 82)  BP: 131/87 ( @ 05:00) (131/87 - 173/73)  RR: 20 ( @ 05:00) (18 - 20)  SpO2: 98% ( @ 13:23) (98% - 98%)    LABS:                        7.9    10.66 )-----------( 274      ( 30 Mar 2022 06:20 )             23.2         142  |  106  |  12  ----------------------------<  102<H>  4.3   |  27  |  0.5<L>    Ca    8.9      30 Mar 2022 06:20  Mg     2.0         TPro  5.2<L>  /  Alb  3.0<L>  /  TBili  1.1  /  DBili  x   /  AST  15  /  ALT  8   /  AlkPhos  62        Urinalysis Basic - ( 29 Mar 2022 00:55 )    Color: Yellow / Appearance: Clear / S.031 / pH: x  Gluc: x / Ketone: Trace  / Bili: Negative / Urobili: <2 mg/dL   Blood: x / Protein: 30 mg/dL / Nitrite: Negative   Leuk Esterase: Negative / RBC: 5 /HPF / WBC 5 /HPF   Sq Epi: x / Non Sq Epi: 1 /HPF / Bacteria: Negative          Culture - Blood (collected 28 Mar 2022 11:00)  Source: .Blood Blood  Preliminary Report:    No growth to date.      RADIOLOGY:   Reviewed  CT hip L - subq hematoma 3.6x2.2cm extending at least 3cm (superior portion not visualized)  CT pelvis - subq hematoma 4.7x1.7x10.9cm with reactive edema.        PHYSICAL EXAM:  General: Laying on the hospital bed. NAD.  HEENT: Atraumatic. Normocephalic. EOMI. Conjunctiva and sclera clear.  Cardiac: Normal S1, S2. RRR. No murmurs, rubs, or gallops.  Pulm: CTA b/l no wheezes, rhonchi, or rales.  GI: Soft, nontender, nondistended. BSx4q  Ext: 2+ pulses. Moving all 4 extremities. No edema, cyanosis. L hip TTP.  Neuro: CN II-XII grossly intact  Skin: L hip dressing appears clean. Small mild ecchymoses to L hip extending posteriorly.

## 2022-03-30 NOTE — PROGRESS NOTE ADULT - SUBJECTIVE AND OBJECTIVE BOX
ORTHOPAEDIC SURGERY PROGRESS NOTE    Interval History:  Patient seen and examined at bedside this morning   Pain is controlled.  No complaints of chest pain, SOB, N/V.    MEDICATIONS  (STANDING):  acetaminophen     Tablet .. 650 milliGRAM(s) Oral every 6 hours  enoxaparin Injectable 40 milliGRAM(s) SubCutaneous every 24 hours  lactulose Syrup 10 Gram(s) Oral every 6 hours  polyethylene glycol 3350 17 Gram(s) Oral two times a day  senna 2 Tablet(s) Oral <User Schedule>  valsartan 40 milliGRAM(s) Oral daily    MEDICATIONS  (PRN):  aluminum hydroxide/magnesium hydroxide/simethicone Suspension 30 milliLiter(s) Oral every 6 hours PRN Dyspepsia  melatonin 3 milliGRAM(s) Oral at bedtime PRN Insomnia  morphine  - Injectable 4 milliGRAM(s) IV Push every 6 hours PRN Severe Pain (7 - 10)  traMADol 50 milliGRAM(s) Oral every 8 hours PRN Moderate Pain (4 - 6)    Vital Signs Last 24 Hrs  T(C): 36.7 (30 Mar 2022 05:00), Max: 36.9 (29 Mar 2022 20:35)  T(F): 98 (30 Mar 2022 05:00), Max: 98.5 (29 Mar 2022 20:35)  HR: 75 (30 Mar 2022 05:00) (75 - 82)  BP: 131/87 (30 Mar 2022 05:00) (131/87 - 173/73)  BP(mean): --  RR: 20 (30 Mar 2022 05:00) (18 - 20)  SpO2: 98% (29 Mar 2022 13:23) (98% - 98%)    Physical Exam:   Dressings C/D/I  Distal ace bandage dressing c/d/i  Compartments soft and compressible  Motor intact distally  SILT distally  CR<2sec, palpable pulses                                           7.9    10.66 )-----------( 274      ( 30 Mar 2022 06:20 )             23.2     03-30    142  |  106  |  12  ----------------------------<  102<H>  4.3   |  27  |  0.5<L>    Ca    8.9      30 Mar 2022 06:20  Mg     2.0     03-30    TPro  5.2<L>  /  Alb  3.0<L>  /  TBili  1.1  /  DBili  x   /  AST  15  /  ALT  8   /  AlkPhos  62  03-30      A/P: 89F s/p left hip ORIF on 3/25/22, now POD#5    - UA negative for infxn  -no concern from ortho regarding thigh hematoma; incision with no concern for infected hematoma at this time   - Abx: Ancef completed  - Weightbearing: WBAT  - DVT ppx: SCD, chem  - Pain control  - Home meds  - Trend labs  - PT/OT/Rehab  - Dispo: Pending PT recommendations

## 2022-03-30 NOTE — PROGRESS NOTE ADULT - SUBJECTIVE AND OBJECTIVE BOX
CESAR STUBBS  89y  Female      Patient is a 89y old  Female who presents with a chief complaint of fall.      INTERVAL HPI/OVERNIGHT EVENTS:      ******************************* REVIEW OF SYSTEMS:**********************************************    All other review of systems negative    *********************** VITALS ******************************************    T(F): 98 (22 @ 14:48)  HR: 77 (22 @ 14:48) (75 - 82)  BP: 132/81 (22 @ 14:48) (131/87 - 173/73)  RR: 19 (22 @ 14:48) (18 - 20)  SpO2: --            ******************************** PHYSICAL EXAM:**************************************************  GENERAL: NAD    PSYCH: no agitation, baseline mentation  HEENT:     NERVOUS SYSTEM:  Alert & Oriented X3,   PULMONARY: TRAE, CTA    CARDIOVASCULAR: S1S2 RRR    GI: Soft, NT, ND; BS present.    EXTREMITIES:  2+ Peripheral Pulses, No clubbing, cyanosis, or edema    LYMPH: No lymphadenopathy noted    SKIN: No rashes or lesions      **************************** LABS *******************************************************                          7.9    10.66 )-----------( 274      ( 30 Mar 2022 06:20 )             23.2         142  |  106  |  12  ----------------------------<  102<H>  4.3   |  27  |  0.5<L>    Ca    8.9      30 Mar 2022 06:20  Mg     2.0         TPro  5.2<L>  /  Alb  3.0<L>  /  TBili  1.1  /  DBili  x   /  AST  15  /  ALT  8   /  AlkPhos  62        Urinalysis Basic - ( 29 Mar 2022 00:55 )    Color: Yellow / Appearance: Clear / S.031 / pH: x  Gluc: x / Ketone: Trace  / Bili: Negative / Urobili: <2 mg/dL   Blood: x / Protein: 30 mg/dL / Nitrite: Negative   Leuk Esterase: Negative / RBC: 5 /HPF / WBC 5 /HPF   Sq Epi: x / Non Sq Epi: 1 /HPF / Bacteria: Negative        Lactate Trend        CAPILLARY BLOOD GLUCOSE              **************************Active Medications *******************************************  No Known Allergies      acetaminophen     Tablet .. 650 milliGRAM(s) Oral every 6 hours  aluminum hydroxide/magnesium hydroxide/simethicone Suspension 30 milliLiter(s) Oral every 6 hours PRN  enoxaparin Injectable 40 milliGRAM(s) SubCutaneous every 24 hours  lactulose Syrup 10 Gram(s) Oral every 6 hours  melatonin 3 milliGRAM(s) Oral at bedtime PRN  morphine  - Injectable 4 milliGRAM(s) IV Push every 6 hours PRN  polyethylene glycol 3350 17 Gram(s) Oral two times a day  senna 2 Tablet(s) Oral <User Schedule>  traMADol 50 milliGRAM(s) Oral every 8 hours PRN  valsartan 40 milliGRAM(s) Oral daily      ***************************************************  RADIOLOGY & ADDITIONAL TESTS:    Imaging Personally Reviewed:  [ ] YES  [ ] NO    HEALTH ISSUES - PROBLEM Dx:

## 2022-03-31 ENCOUNTER — TRANSCRIPTION ENCOUNTER (OUTPATIENT)
Age: 87
End: 2022-03-31

## 2022-03-31 VITALS
DIASTOLIC BLOOD PRESSURE: 63 MMHG | OXYGEN SATURATION: 98 % | TEMPERATURE: 98 F | RESPIRATION RATE: 19 BRPM | SYSTOLIC BLOOD PRESSURE: 135 MMHG | HEART RATE: 89 BPM

## 2022-03-31 LAB
-  AMIKACIN: SIGNIFICANT CHANGE UP
-  AMOXICILLIN/CLAVULANIC ACID: SIGNIFICANT CHANGE UP
-  AMPICILLIN/SULBACTAM: SIGNIFICANT CHANGE UP
-  AMPICILLIN: SIGNIFICANT CHANGE UP
-  AZTREONAM: SIGNIFICANT CHANGE UP
-  CEFAZOLIN: SIGNIFICANT CHANGE UP
-  CEFEPIME: SIGNIFICANT CHANGE UP
-  CEFOXITIN: SIGNIFICANT CHANGE UP
-  CEFTRIAXONE: SIGNIFICANT CHANGE UP
-  CIPROFLOXACIN: SIGNIFICANT CHANGE UP
-  ERTAPENEM: SIGNIFICANT CHANGE UP
-  GENTAMICIN: SIGNIFICANT CHANGE UP
-  LEVOFLOXACIN: SIGNIFICANT CHANGE UP
-  MEROPENEM: SIGNIFICANT CHANGE UP
-  NITROFURANTOIN: SIGNIFICANT CHANGE UP
-  PIPERACILLIN/TAZOBACTAM: SIGNIFICANT CHANGE UP
-  TOBRAMYCIN: SIGNIFICANT CHANGE UP
-  TRIMETHOPRIM/SULFAMETHOXAZOLE: SIGNIFICANT CHANGE UP
ALBUMIN SERPL ELPH-MCNC: 2.8 G/DL — LOW (ref 3.5–5.2)
ALP SERPL-CCNC: 62 U/L — SIGNIFICANT CHANGE UP (ref 30–115)
ALT FLD-CCNC: 7 U/L — SIGNIFICANT CHANGE UP (ref 0–41)
ANION GAP SERPL CALC-SCNC: 9 MMOL/L — SIGNIFICANT CHANGE UP (ref 7–14)
AST SERPL-CCNC: 14 U/L — SIGNIFICANT CHANGE UP (ref 0–41)
BASOPHILS # BLD AUTO: 0.03 K/UL — SIGNIFICANT CHANGE UP (ref 0–0.2)
BASOPHILS NFR BLD AUTO: 0.3 % — SIGNIFICANT CHANGE UP (ref 0–1)
BILIRUB SERPL-MCNC: 1 MG/DL — SIGNIFICANT CHANGE UP (ref 0.2–1.2)
BUN SERPL-MCNC: 13 MG/DL — SIGNIFICANT CHANGE UP (ref 10–20)
CALCIUM SERPL-MCNC: 8.7 MG/DL — SIGNIFICANT CHANGE UP (ref 8.5–10.1)
CHLORIDE SERPL-SCNC: 108 MMOL/L — SIGNIFICANT CHANGE UP (ref 98–110)
CO2 SERPL-SCNC: 25 MMOL/L — SIGNIFICANT CHANGE UP (ref 17–32)
CREAT SERPL-MCNC: 0.5 MG/DL — LOW (ref 0.7–1.5)
CULTURE RESULTS: SIGNIFICANT CHANGE UP
EGFR: 85 ML/MIN/1.73M2 — SIGNIFICANT CHANGE UP
EOSINOPHIL # BLD AUTO: 0.24 K/UL — SIGNIFICANT CHANGE UP (ref 0–0.7)
EOSINOPHIL NFR BLD AUTO: 2.5 % — SIGNIFICANT CHANGE UP (ref 0–8)
GLUCOSE SERPL-MCNC: 97 MG/DL — SIGNIFICANT CHANGE UP (ref 70–99)
HCT VFR BLD CALC: 24.8 % — LOW (ref 37–47)
HGB BLD-MCNC: 7.9 G/DL — LOW (ref 12–16)
IMM GRANULOCYTES NFR BLD AUTO: 0.3 % — SIGNIFICANT CHANGE UP (ref 0.1–0.3)
LYMPHOCYTES # BLD AUTO: 2 K/UL — SIGNIFICANT CHANGE UP (ref 1.2–3.4)
LYMPHOCYTES # BLD AUTO: 20.7 % — SIGNIFICANT CHANGE UP (ref 20.5–51.1)
MAGNESIUM SERPL-MCNC: 2.1 MG/DL — SIGNIFICANT CHANGE UP (ref 1.8–2.4)
MCHC RBC-ENTMCNC: 31.9 G/DL — LOW (ref 32–37)
MCHC RBC-ENTMCNC: 34.1 PG — HIGH (ref 27–31)
MCV RBC AUTO: 106.9 FL — HIGH (ref 81–99)
METHOD TYPE: SIGNIFICANT CHANGE UP
MONOCYTES # BLD AUTO: 0.95 K/UL — HIGH (ref 0.1–0.6)
MONOCYTES NFR BLD AUTO: 9.8 % — HIGH (ref 1.7–9.3)
NEUTROPHILS # BLD AUTO: 6.43 K/UL — SIGNIFICANT CHANGE UP (ref 1.4–6.5)
NEUTROPHILS NFR BLD AUTO: 66.4 % — SIGNIFICANT CHANGE UP (ref 42.2–75.2)
NRBC # BLD: 0 /100 WBCS — SIGNIFICANT CHANGE UP (ref 0–0)
ORGANISM # SPEC MICROSCOPIC CNT: SIGNIFICANT CHANGE UP
ORGANISM # SPEC MICROSCOPIC CNT: SIGNIFICANT CHANGE UP
PLATELET # BLD AUTO: 323 K/UL — SIGNIFICANT CHANGE UP (ref 130–400)
POTASSIUM SERPL-MCNC: 4.2 MMOL/L — SIGNIFICANT CHANGE UP (ref 3.5–5)
POTASSIUM SERPL-SCNC: 4.2 MMOL/L — SIGNIFICANT CHANGE UP (ref 3.5–5)
PROT SERPL-MCNC: 5.2 G/DL — LOW (ref 6–8)
RBC # BLD: 2.32 M/UL — LOW (ref 4.2–5.4)
RBC # FLD: 14.9 % — HIGH (ref 11.5–14.5)
SODIUM SERPL-SCNC: 142 MMOL/L — SIGNIFICANT CHANGE UP (ref 135–146)
SPECIMEN SOURCE: SIGNIFICANT CHANGE UP
WBC # BLD: 9.68 K/UL — SIGNIFICANT CHANGE UP (ref 4.8–10.8)
WBC # FLD AUTO: 9.68 K/UL — SIGNIFICANT CHANGE UP (ref 4.8–10.8)

## 2022-03-31 PROCEDURE — 99232 SBSQ HOSP IP/OBS MODERATE 35: CPT

## 2022-03-31 RX ORDER — ACETAMINOPHEN 500 MG
2 TABLET ORAL
Qty: 0 | Refills: 0 | DISCHARGE
Start: 2022-03-31

## 2022-03-31 RX ORDER — OXYCODONE HYDROCHLORIDE 5 MG/1
5 TABLET ORAL ONCE
Refills: 0 | Status: DISCONTINUED | OUTPATIENT
Start: 2022-03-31 | End: 2022-03-31

## 2022-03-31 RX ORDER — OXYCODONE HYDROCHLORIDE 5 MG/1
5 TABLET ORAL EVERY 8 HOURS
Refills: 0 | Status: DISCONTINUED | OUTPATIENT
Start: 2022-03-31 | End: 2022-03-31

## 2022-03-31 RX ORDER — POLYETHYLENE GLYCOL 3350 17 G/17G
17 POWDER, FOR SOLUTION ORAL
Qty: 0 | Refills: 0 | DISCHARGE
Start: 2022-03-31

## 2022-03-31 RX ORDER — ACETAMINOPHEN 500 MG
2 TABLET ORAL
Qty: 0 | Refills: 0 | DISCHARGE

## 2022-03-31 RX ORDER — OXYCODONE HYDROCHLORIDE 5 MG/1
1 TABLET ORAL
Qty: 0 | Refills: 0 | DISCHARGE
Start: 2022-03-31

## 2022-03-31 RX ORDER — TRAMADOL HYDROCHLORIDE 50 MG/1
1 TABLET ORAL
Qty: 0 | Refills: 0 | DISCHARGE
Start: 2022-03-31

## 2022-03-31 RX ADMIN — POLYETHYLENE GLYCOL 3350 17 GRAM(S): 17 POWDER, FOR SOLUTION ORAL at 05:13

## 2022-03-31 RX ADMIN — Medication 650 MILLIGRAM(S): at 19:11

## 2022-03-31 RX ADMIN — LACTULOSE 10 GRAM(S): 10 SOLUTION ORAL at 05:13

## 2022-03-31 RX ADMIN — Medication 650 MILLIGRAM(S): at 05:42

## 2022-03-31 RX ADMIN — OXYCODONE HYDROCHLORIDE 5 MILLIGRAM(S): 5 TABLET ORAL at 19:12

## 2022-03-31 RX ADMIN — VALSARTAN 40 MILLIGRAM(S): 80 TABLET ORAL at 05:12

## 2022-03-31 RX ADMIN — OXYCODONE HYDROCHLORIDE 5 MILLIGRAM(S): 5 TABLET ORAL at 15:35

## 2022-03-31 RX ADMIN — Medication 650 MILLIGRAM(S): at 18:32

## 2022-03-31 RX ADMIN — Medication 650 MILLIGRAM(S): at 05:12

## 2022-03-31 RX ADMIN — ENOXAPARIN SODIUM 40 MILLIGRAM(S): 100 INJECTION SUBCUTANEOUS at 05:12

## 2022-03-31 RX ADMIN — Medication 650 MILLIGRAM(S): at 12:38

## 2022-03-31 RX ADMIN — Medication 650 MILLIGRAM(S): at 12:50

## 2022-03-31 NOTE — DISCHARGE NOTE NURSING/CASE MANAGEMENT/SOCIAL WORK - PATIENT PORTAL LINK FT
You can access the FollowMyHealth Patient Portal offered by [facility] by registering at the following website: [email or web address]. By joining [employer]’s FollowMyHealth portal, you will also be able to view your health information using other applications (apps) compatible with our system.

## 2022-03-31 NOTE — DISCHARGE NOTE PROVIDER - NSDCMRMEDTOKEN_GEN_ALL_CORE_FT
cyanocobalamin 1000 mcg/mL injectable solution: 1 milliliter(s) injectable every 30 days  docusate potassium 100 mg oral capsule: 3 cap(s) orally once a day (at bedtime)  Aimee-Lanta oral suspension: 10 milliliter(s) orally 4 times a day (after meals and at bedtime), As Needed  lactulose 10 g/15 mL oral syrup: 10 gram(s) orally 2 times a day  polyethylene glycol 3350 oral powder for reconstitution: 17 gram(s) orally 2 times a day  Senna 8.6 mg oral tablet: 2 tab(s) orally once a day at 9 AM  traMADol 50 mg oral tablet: 1 tab(s) orally every 8 hours, As needed, Moderate Pain (4 - 6)  Tylenol 325 mg oral tablet: 2 tab(s) orally every 6 hours, As Needed  valsartan 80 mg oral tablet: 1 tab(s) orally once a day   cyanocobalamin 1000 mcg/mL injectable solution: 1 milliliter(s) injectable every 30 days  docusate potassium 100 mg oral capsule: 3 cap(s) orally once a day (at bedtime)  Aimee-Lanta oral suspension: 10 milliliter(s) orally 4 times a day (after meals and at bedtime), As Needed  lactulose 10 g/15 mL oral syrup: 10 gram(s) orally 2 times a day  polyethylene glycol 3350 oral powder for reconstitution: 17 gram(s) orally 2 times a day  Roxicodone 5 mg oral tablet: 1 tab(s) orally every 8 hours, As needed, Severe Pain (7 - 10)  Senna 8.6 mg oral tablet: 2 tab(s) orally once a day at 9 AM  traMADol 50 mg oral tablet: 1 tab(s) orally every 8 hours, As needed, Moderate Pain (4 - 6)  Tylenol 325 mg oral tablet: 2 tab(s) orally every 6 hours, As Needed  valsartan 80 mg oral tablet: 1 tab(s) orally once a day

## 2022-03-31 NOTE — DISCHARGE NOTE PROVIDER - CARE PROVIDER_API CALL
Isacc Johns)  Orthopaedic Surgery  [address] [place] [address]  Phone: [phone]  Fax: [phone]  Follow Up Time: 2 weeks

## 2022-03-31 NOTE — PROGRESS NOTE ADULT - PROVIDER SPECIALTY LIST ADULT
Internal Medicine
Orthopedics
Hospitalist
Hospitalist
Internal Medicine
Internal Medicine
Orthopedics
Hospitalist
Internal Medicine
Internal Medicine

## 2022-03-31 NOTE — PROGRESS NOTE ADULT - SUBJECTIVE AND OBJECTIVE BOX
CESAR STUBBS  89y  Female      Patient is a 89y old  Female who presents with a chief complaint of fall.      INTERVAL HPI/OVERNIGHT EVENTS:      ******************************* REVIEW OF SYSTEMS:**********************************************    All other review of systems negative    *********************** VITALS ******************************************    T(F): 98.6 (03-31-22 @ 15:06)  HR: 71 (03-31-22 @ 15:06) (71 - 77)  BP: 146/67 (03-31-22 @ 15:06) (130/71 - 162/78)  RR: 19 (03-31-22 @ 15:06) (18 - 19)  SpO2: 98% (03-30-22 @ 21:16) (98% - 98%)            ******************************** PHYSICAL EXAM:**************************************************  GENERAL: NAD    PSYCH: no agitation, baseline mentation  HEENT:     NERVOUS SYSTEM:  Alert & Oriented X3,   PULMONARY: TRAE, CTA    CARDIOVASCULAR: S1S2 RRR    GI: Soft, NT, ND; BS present.    EXTREMITIES:  2+ Peripheral Pulses, left hip hematoma, stable     LYMPH: No lymphadenopathy noted    SKIN: No rashes or lesions      **************************** LABS *******************************************************                          7.9    9.68  )-----------( 323      ( 31 Mar 2022 04:30 )             24.8     03-31    142  |  108  |  13  ----------------------------<  97  4.2   |  25  |  0.5<L>    Ca    8.7      31 Mar 2022 04:30  Mg     2.1     03-31    TPro  5.2<L>  /  Alb  2.8<L>  /  TBili  1.0  /  DBili  x   /  AST  14  /  ALT  7   /  AlkPhos  62  03-31          Lactate Trend        CAPILLARY BLOOD GLUCOSE              **************************Active Medications *******************************************  No Known Allergies      acetaminophen     Tablet .. 650 milliGRAM(s) Oral every 6 hours  aluminum hydroxide/magnesium hydroxide/simethicone Suspension 30 milliLiter(s) Oral every 6 hours PRN  enoxaparin Injectable 40 milliGRAM(s) SubCutaneous every 24 hours  lactulose Syrup 10 Gram(s) Oral every 6 hours  melatonin 3 milliGRAM(s) Oral at bedtime PRN  oxyCODONE    IR 5 milliGRAM(s) Oral once  oxyCODONE    IR 5 milliGRAM(s) Oral every 8 hours PRN  polyethylene glycol 3350 17 Gram(s) Oral two times a day  senna 2 Tablet(s) Oral <User Schedule>  traMADol 50 milliGRAM(s) Oral every 8 hours PRN  valsartan 40 milliGRAM(s) Oral daily      ***************************************************  RADIOLOGY & ADDITIONAL TESTS:    Imaging Personally Reviewed:  [ ] YES  [ ] NO    HEALTH ISSUES - PROBLEM Dx:

## 2022-03-31 NOTE — DISCHARGE NOTE PROVIDER - NSDCCPCAREPLAN_GEN_ALL_CORE_FT
PRINCIPAL DISCHARGE DIAGNOSIS  Diagnosis: Hip fracture  Assessment and Plan of Treatment: You came to the hospital after a fall. You were found to have a left hip fracture. You were seen by the orthopedic surgery team and underwent open reduction internal fixation of the left hip. Your admission was complicated by post-operative anemia and soft tissue hematoma of left hip. Your hemoglobin has stablized ~8. You will need to follow-up with your primary doctor and the orthopedic surgery team.

## 2022-03-31 NOTE — PROGRESS NOTE ADULT - ASSESSMENT
89 year old female with PMH of constipation, depression, GERD, gout, HTN, OA, and osteoporosis presented to the ED after fall.     # L intertrochanteric fracture secondary to fall, unclear etiology  # Osteoporosis   - Trauma work up was remarkable for acute comminuted mildly displaced L intertrochanteric femur fracture  - NWB LLE, bed rest for now   - Ortho intervention today  - start LR at 50cc/hr while NPO, and kept at this rate  - hold lovenox for possible surgical intervention, ortho recommends lovenox 40mg daily at 2200  - pain control with Tylenol and morphine PRN  - Obtained medical clearance for surgery    # CV assessment    Patient unaware how she fell; her fall was unwitnessed  Patient denies chest pain, palpitations, dyspnea, features of vasovagal or seizures  Will need CV assessment; EKG shows NSR    # HTN   # hypertensive urgency in the ED likely secondary to pain  - continue with valsaratan 80mg PO daily and pain control    # new mild R-sided hydronephrosis with 3 ureteral calculi, measuring 3-4mm in size noted on CT   - if worsening or Cr rising consider urology consult     # Constipation - continue with senna and lactulose. add miralax while inpatient.  # GERD - takes xochitl-lanta at [facility], start maalox PRN while inpatient     # Activity: bed rest for now   # DVT ppx: lovenox when surgery is done  # GI ppx: not indicated   # Diet: DASH, NPO  # Dispo: acute, from [facility]   # Code status: DNR/DNI - paperwork from NH in chart, confirmed with patient's daughters and patient. 
89 year old female with PMH of constipation, depression, GERD, gout, HTN, OA, and osteoporosis presented to the ED after fall.     # L intertrochanteric fracture secondary to fall, unclear etiology  # Osteoporosis   - Trauma work up was remarkable for acute comminuted mildly displaced L intertrochanteric femur fracture  - WB and PT after procedure  - ORIF of left hip yesterday; procedure went uncomplicated  - We started the patient on diet  - Lovenox 40 mg sc continued  - pain control with Tylenol and morphine PRN    # CV assessment    Patient unaware how she fell; her fall was unwitnessed  Patient denies chest pain, palpitations, dyspnea, features of vasovagal or seizures  Will need CV assessment; EKG shows NSR  TTE shows no systolic/ diastolic dysfunction; no major valve abnormalities, but presence of severe pulmonary hypertension that needs investigation    # HTN   # hypertensive urgency in the ED likely secondary to pain  - continue with valsaratan 80mg PO daily and pain control    # new mild R-sided hydronephrosis with 3 ureteral calculi, measuring 3-4mm in size noted on CT   - if worsening or Cr rising consider urology consult     # Constipation - continue with senna and lactulose. add miralax while inpatient.  # GERD - takes xochitl-lanta at [facility], start maalox PRN while inpatient     # Activity: bed rest for now with WBE  # DVT ppx: lovenox  # GI ppx: not indicated   # Diet: DASH  # Dispo: acute, from [facility]   # Code status: DNR/DNI - paperwork from NH in chart, confirmed with patient's daughters and patient. 
ASSESSMENT & PLAN  89 year old female with PMH of constipation, depression, GERD, gout, HTN, OA, and osteoporosis presented to the ED after fall. Patient is uncertain why she feel, she was ambulating with her walker and found herself on the floor. After the fall she complained of L hip pain and was unable to ambulate.     # L intertrochanteric femur fracture s/p ORIF  - pt presented after fall at NH  - XR, CT demonstrated L femur fx  - s/p ortho eval - ORIF 3/25 - OOBTC, rehab, d/c planning  - ortho rec adjusting pain regimen to include celecoxib, toradol -- will hold off for now given dropping hgb  - PT/Rehab  - cont to monitor wound for drainage    # Macrocytic anemia, suspect chronic  - Hgb 12.8 on admission, decreasing during admission s/p 1 unit -- today 7.9 -- appears stable  - macrocytic -- B12, folate, TSH wnl  - retic % 6.0, abs retic 135  - Repeat CT L hip showed soft tissue hematoma ~3.6x2cm (not fully seen)  - CT pelvis w 4.7 x 1.7 x 10.9 cm hematoma  - keep Hgb >7, active T&S, CBC q24h    # Leukocytosis, resolved  - WBC 15.6->11.7->10.3->11.7->10.7, remains afebrile  - possible reactive 2/2 ORIF  - UA negative, procal 0.07, CXR stable  - BCx 3/28 NGTD  - off abx    # Constipation  - cont senna 2 qhs, miralax BID, lactulose 10q6h  - s/p dulcolax suppository x1 yesterday  - no BMs, pt continues to have constipation  - enema today    # Urinary retention s/p sharpe  - TOV initiated yesterday  - bladder scan ~200cc this morning  - bladder scan q6h, if continues to retain will place sharpe    # HTN  - BP: 131/87 (131/87 - 173/73)  - cont valsartan 40mg qd    PT/REHAB: PT/REHAB  ACTIVITY: Activity - Out of Bed with Assistance  DVT PPX: enoxaparin Injectable  GI PPX: n/a  DIET: Diet, DASH/TLC  CODE: Full    Disposition: STR  Pending: AMNA   
ASSESSMENT & PLAN  89 year old female with PMH of constipation, depression, GERD, gout, HTN, OA, and osteoporosis presented to the ED after fall. Patient is uncertain why she feel, she was ambulating with her walker and found herself on the floor. After the fall she complained of L hip pain and was unable to ambulate.     # L intertrochanteric femur fracture s/p ORIF  - pt presented after fall at NH  - XR, CT demonstrated L femur fx  - s/p ortho eval - ORIF 3/25 - OOBTC, rehab, d/c planning  - ortho rec adjusting pain regimen to include celecoxib, toradol -- will hold off for now given dropping hgb  - PT/Rehab  - cont to monitor wound for drainage    # Macrocytic anemia, suspect chronic  - Hgb 12.8 on admission, decreasing during admission s/p 1 unit -- today 7.9 -- appears stable  - macrocytic -- B12, folate, TSH wnl  - retic % 6.0, abs retic 135  - Repeat CT L hip showed soft tissue hematoma ~3.6x2cm (not fully seen)  - CT pelvis w 4.7 x 1.7 x 10.9 cm hematoma  - keep Hgb >7, active T&S, CBC q24h    # Leukocytosis, resolved  - WBC 15.6->11.7->10.3->11.7->10.7, remains afebrile  - possible reactive 2/2 ORIF  - UA negative, procal 0.07, CXR stable  - BCx 3/28 NGTD  - off abx    # Constipation  - cont senna 2 qhs, miralax BID, lactulose 10q6h  - s/p dulcolax suppository x1 yesterday  - no BMs, pt continues to have constipation  - enema today    # Urinary retention s/p sharpe  - TOV initiated yesterday  - bladder scan ~200cc this morning  - bladder scan q6h, if continues to retain will place sharpe    # HTN  - BP: 131/87 (131/87 - 173/73)  - cont valsartan 40mg qd    PT/REHAB: PT/REHAB  ACTIVITY: Activity - Out of Bed with Assistance  DVT PPX: enoxaparin Injectable  GI PPX: n/a  DIET: Diet, DASH/TLC  CODE: Full  Disposition: Acute  Pending: TOV. PT. BM  
ASSESSMENT & PLAN  89 year old female with PMH of constipation, depression, GERD, gout, HTN, OA, and osteoporosis presented to the ED after fall. Patient is uncertain why she feel, she was ambulating with her walker and found herself on the floor. After the fall she complained of L hip pain and was unable to ambulate.     # L intertrochanteric femur fracture s/p ORIF  - pt presented after fall at NH  - XR, CT demonstrated L femur fx  - s/p ortho eval - ORIF 3/25 - OOBTC, rehab, d/c planning  - ortho rec adjusting pain regimen to include celecoxib, toradol -- will hold off for now given dropping hgb  - PT/Rehab  - s/p dressing change by ortho today, continues to drain serous fluid    # Acute anemia  - Hgb 12.8 on admission, decreasing during admission to 8.0 yesterday received 1 unit and improved to 8.6 today  - macrocytic  - Repeat CT L hip showed soft tissue hematoma ~3.6x2cm (not fully seen) - rec CT pelvis to fully evaluate  - keep Hgb >7, active T&S, CBC BID for now -- f/u 8 PM    # Leukocytosis  - WBC 15.6 today, afebrile  - possible reactive 2/2 ORIF  - check U/A, UCx, BCx, procal, CXR    # Constipation  - cont senna 2 qhs  - increase miralax to BID, increase lactulose to 10g q6h  - monitor for BMs, titrate as needed    # HTN  - BP: 125/68 (104/42 - 136/64)  - cont valsartan 40mg qd    PT/REHAB: PT/REHAB  ACTIVITY: Activity - Out of Bed with Assistance  DVT PPX: enoxaparin Injectable  GI PPX: n/a  DIET: Diet, DASH/TLC  CODE: Full  Disposition: Acute  Pending: CT pelvis, CBC @ 8 PM, leukocytosis w/u.   
ASSESSMENT & PLAN  89 year old female with PMH of constipation, depression, GERD, gout, HTN, OA, and osteoporosis presented to the ED after fall. Patient is uncertain why she feel, she was ambulating with her walker and found herself on the floor. After the fall she complained of L hip pain and was unable to ambulate.     # L intertrochanteric femur fracture s/p ORIF  - pt presented after fall at NH  - XR, CT demonstrated L femur fx  - s/p ortho eval - ORIF 3/25 - OOBTC, rehab, d/c planning  - ortho recs noted >>> WBAT.     # Macrocytic anemia, suspect chronic  - Hgb 12.8 on admission, decreasing during admission s/p 1 unit -- today 7.9 -- appears stable  - macrocytic -- B12, folate, TSH wnl  - Repeat CT L hip showed soft tissue hematoma ~3.6x2cm (not fully seen)  - CT pelvis w 4.7 x 1.7 x 10.9 cm hematoma  - Trend CBC     # Leukocytosis, resolved  - WBC 15.6->11.7->10.3->11.7->10.7, remains afebrile  - possible reactive 2/2 ORIF  - UA negative, procal 0.07, CXR stable  - BCx 3/28 NGTD  - off abx    # Constipation  - cont senna 2 qhs, miralax BID, lactulose 10q6h  - s/p dulcolax suppository x1 yesterday  - no BMs, pt continues to have constipation    # Urinary retention  - TOV  >> Failed.   - Straight cath > if drains > 250 , sharpe back in.    # HTN  - BP: 131/87 (131/87 - 173/73)  - cont valsartan 40mg qd    PT/REHAB: PT/REHAB  ACTIVITY: Activity - Out of Bed with Assistance  DVT PPX: enoxaparin Inj  DIET: Diet, DASH/TLC  CODE: Full    Disposition: [facility] for LTC via Ambulance today.   
Patient seen and examined independently of resident. My addendum supersedes resident notation. Case discussed with housestaff, nursing and patient    89y F hx constipation, gerd, dementia, depression, gout, htn, oa/ osteoporosis here c unwitnessed fall and L IT fx (pathologic in context of underlying osteoporosis) underwent ORIF of Left femur using Gamma nail , preoperatively found to have pulmonary htn on echo, new mild R hydronephrosis, course complicated by metabolic encephalopathy and post operative anemia    therapeutic transfusion 1 u prbc,   ortho f/u appreciated  wbat  involve PT when more awake  reorientation tactics shared with daughter at bedside  analgesia  bowel regimen  some post op anemia on labs- t+s, trend and monitor sx site  post op incentive spirometry   euvolemic on exam today    Provider Hand off  Pending- improvement in mentation, f/u hgb s/p transfusion, PT, u/o, snf rehab  Disposition- snf/rehab
ASSESSMENT & PLAN  89 year old female with PMH of constipation, depression, GERD, gout, HTN, OA, and osteoporosis presented to the ED after fall. Patient is uncertain why she feel, she was ambulating with her walker and found herself on the floor. After the fall she complained of L hip pain and was unable to ambulate.     # L intertrochanteric femur fracture s/p ORIF  - pt presented after fall at NH  - XR, CT demonstrated L femur fx  - s/p ortho eval - ORIF 3/25 - OOBTC, rehab, d/c planning  - ortho rec adjusting pain regimen to include celecoxib, toradol -- will hold off for now given dropping hgb  - PT/Rehab  - cont to monitor wound for drainage    # Acute anemia  - Hgb 12.8 on admission, decreasing during admission s/p 1 unit -- today 8.1  - macrocytic -- B12, folate, TSH wnl  - Repeat CT L hip showed soft tissue hematoma ~3.6x2cm (not fully seen)  - CT pelvis w 4.7 x 1.7 x 10.9 cm hematoma  - keep Hgb >7, active T&S, CBC q24h    # Leukocytosis, resolved  - WBC 15.6->11.7, remains afebrile  - possible reactive 2/2 ORIF  - UA negative, procal 0.07, CXR stable  - UCx, BCx pending  - started on ancef 1g q8h yesterday, dc today    # Constipation  - cont senna 2 qhs, miralax BID, lactulose 10q6h  - dulcolax suppository x1 today  - monitor for BMs, titrate as needed    # Urinary retention s/p sharpe  - TOV today    # HTN  - BP: 133/62 (133/62 - 147/67)  - cont valsartan 40mg qd    PT/REHAB: PT/REHAB  ACTIVITY: Activity - Out of Bed with Assistance  DVT PPX: enoxaparin Injectable  GI PPX: n/a  DIET: Diet, DASH/TLC  CODE: Full  Disposition: Acute  Pending: BCx, UCx. TOV. pain control. PT. BM

## 2022-03-31 NOTE — DISCHARGE NOTE NURSING/CASE MANAGEMENT/SOCIAL WORK - NSDCPEFALRISK_GEN_ALL_CORE
For information on Fall & Injury Prevention, visit: [email or web address] OR  [email or web address] OR  https://www.cdc.gov/steadi/patient.html

## 2022-03-31 NOTE — CHART NOTE - NSCHARTNOTEFT_GEN_A_CORE
<<<RESIDENT DISCHARGE NOTE>>>     CESAR STUBBS  [id number]    VITAL SIGNS:  T(F): 98.6 (03-31-22 @ 15:06), Max: 98.6 (03-31-22 @ 15:06)  HR: 71 (03-31-22 @ 15:06)  BP: 146/67 (03-31-22 @ 15:06)  SpO2: 98% (03-30-22 @ 21:16)      PHYSICAL EXAMINATION:  General: Laying in the hospital bed. NAD.  Head & Neck: NCAT, EOMI, conjunctiva and sclera clear  Pulmonary: CTA b/l no wheezes, rhonchi, or rales  Cardiovascular: Normal S1, S2. No murmurs, rubs, or gallops  Gastrointestinal/Abdomen & Pelvis: Soft, NTND  Neurologic/Motor: CN II-XII grossly intact. Moving all 4 extremities. L hip TTP.     TEST RESULTS:                        7.9    9.68  )-----------( 323      ( 31 Mar 2022 04:30 )             24.8       03-31    142  |  108  |  13  ----------------------------<  97  4.2   |  25  |  0.5<L>    Ca    8.7      31 Mar 2022 04:30  Mg     2.1     03-31    TPro  5.2<L>  /  Alb  2.8<L>  /  TBili  1.0  /  DBili  x   /  AST  14  /  ALT  7   /  AlkPhos  62  03-31      FINAL DISCHARGE INTERVIEW:  Resident(s) Present: (Name: Dr. Weber), RN Present: (Name:  RN)    DISCHARGE MEDICATION RECONCILIATION  reviewed with Attending (Name: Dr. Penny)    DISPOSITION:   [  ] Home,    [  ] Home with Visiting Nursing Services,   [  x  ]  SNF/ NH,    [   ] Acute Rehab (4A),   [   ] Other (Specify:_________)                 Pt dc to SNF for STR. Pain control, adding oxycodone 5 for severe pain. Pt endorses no pain, however family states pt c/o pain to them.

## 2022-03-31 NOTE — DISCHARGE NOTE PROVIDER - HOSPITAL COURSE
89 year old female with PMH of constipation, depression, GERD, gout, HTN, OA, and osteoporosis presented to the ED after fall. Patient is uncertain why she feel, she was ambulating with her walker and found herself on the floor. After the fall she complained of L hip pain and was unable to ambulate.     In the ED, patient hypertensive 192/80, vitals otherwise stable. Trauma work up was remarkable for acute comminuted mildly displaced L intertrochanteric femur fracture and new mild R-sided hydronephrosis with 3 ureteral calculi, measuring 3-4mm in size. Orthopedics evaluated the patient for possible surgery. Admitted to medicine for further work up and management.    Pt seen by ortho for L hip fx. Went for ORIF 3/25. Course c/b L hip soft tissue hematoma and dropping hgb. Hgb stabilized. Also c/b urinary retention - failed TOV 3/29-31. Pt working with PT. Will be discharged to SNF for continued PT and plan for repeat TOV after improvement in ambulation.    Progress Note:  89 year old female with PMH of constipation, depression, GERD, gout, HTN, OA, and osteoporosis presented to the ED after fall. Patient is uncertain why she feel, she was ambulating with her walker and found herself on the floor. After the fall she complained of L hip pain and was unable to ambulate.     # L intertrochanteric femur fracture s/p ORIF  - pt presented after fall at NH  - XR, CT demonstrated L femur fx  - s/p ortho eval - ORIF 3/25 - OOBTC, rehab, d/c planning  - ortho rec adjusting pain regimen to include celecoxib, toradol -- will hold off for now given dropping hgb  - PT/Rehab  - cont to monitor wound for drainage    # Macrocytic anemia, suspect chronic  - Hgb 12.8 on admission, decreasing during admission s/p 1 unit -- today 7.9 -- appears stable  - macrocytic -- B12, folate, TSH wnl  - retic % 6.0, abs retic 135  - Repeat CT L hip showed soft tissue hematoma ~3.6x2cm (not fully seen)  - CT pelvis w 4.7 x 1.7 x 10.9 cm hematoma  - keep Hgb >7, active T&S, CBC q24h    # Leukocytosis, resolved  - WBC 15.6->11.7->10.3->11.7->10.7, remains afebrile  - possible reactive 2/2 ORIF  - UA negative, procal 0.07, CXR stable  - BCx 3/28 NGTD  - off abx    # Constipation  - cont senna 2 qhs, miralax BID, lactulose 10q6h  - s/p dulcolax suppository x1 yesterday  - no BMs, pt continues to have constipation  - enema today    # Urinary retention s/p sharpe  - TOV initiated yesterday  - bladder scan ~200cc this morning  - bladder scan q6h, if continues to retain will place sharpe    # HTN  - BP: 131/87 (131/87 - 173/73)  - cont valsartan 40mg qd    PT/REHAB: PT/REHAB  ACTIVITY: Activity - Out of Bed with Assistance  DVT PPX: enoxaparin Injectable  GI PPX: n/a  DIET: Diet, DASH/TLC  CODE: Full  Disposition: Acute  Pending: TOV. PT. BM

## 2022-04-02 LAB
CULTURE RESULTS: SIGNIFICANT CHANGE UP
SPECIMEN SOURCE: SIGNIFICANT CHANGE UP
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
## 2022-04-05 DIAGNOSIS — F03.90 UNSPECIFIED DEMENTIA WITHOUT BEHAVIORAL DISTURBANCE: ICD-10-CM

## 2022-04-05 DIAGNOSIS — R33.9 RETENTION OF URINE, UNSPECIFIED: ICD-10-CM

## 2022-04-05 DIAGNOSIS — N13.2 HYDRONEPHROSIS WITH RENAL AND URETERAL CALCULOUS OBSTRUCTION: ICD-10-CM

## 2022-04-05 DIAGNOSIS — D53.9 NUTRITIONAL ANEMIA, UNSPECIFIED: ICD-10-CM

## 2022-04-05 DIAGNOSIS — M80.052A AGE-RELATED OSTEOPOROSIS WITH CURRENT PATHOLOGICAL FRACTURE, LEFT FEMUR, INITIAL ENCOUNTER FOR FRACTURE: ICD-10-CM

## 2022-04-05 DIAGNOSIS — I16.0 HYPERTENSIVE URGENCY: ICD-10-CM

## 2022-04-05 DIAGNOSIS — K21.9 GASTRO-ESOPHAGEAL REFLUX DISEASE WITHOUT ESOPHAGITIS: ICD-10-CM

## 2022-04-05 DIAGNOSIS — G93.41 METABOLIC ENCEPHALOPATHY: ICD-10-CM

## 2022-04-05 DIAGNOSIS — D64.89 OTHER SPECIFIED ANEMIAS: ICD-10-CM

## 2022-04-05 DIAGNOSIS — I27.20 PULMONARY HYPERTENSION, UNSPECIFIED: ICD-10-CM
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
## 2022-04-05 DIAGNOSIS — L76.32 POSTPROCEDURAL HEMATOMA OF SKIN AND SUBCUTANEOUS TISSUE FOLLOWING OTHER PROCEDURE: ICD-10-CM

## 2022-04-05 DIAGNOSIS — F32.A DEPRESSION, UNSPECIFIED: ICD-10-CM

## 2022-04-05 DIAGNOSIS — Z66 DO NOT RESUSCITATE: ICD-10-CM

## 2022-04-05 DIAGNOSIS — J98.11 ATELECTASIS: ICD-10-CM

## 2022-04-05 DIAGNOSIS — K59.00 CONSTIPATION, UNSPECIFIED: ICD-10-CM

## 2023-08-27 NOTE — ED ADULT TRIAGE NOTE - INTERNATIONAL TRAVEL
Pt c/o intermittent nausea and vomiting x 4 days with constipation x 2 days. Denies black, brown, or red emesis. States that she is prescribed PO phenergan and it is not working. States that she sees GI for same issues and has never been told why this happens. States GI doc is with UC. No Previously Declined (within the last year)